# Patient Record
Sex: FEMALE | Race: OTHER | NOT HISPANIC OR LATINO | URBAN - METROPOLITAN AREA
[De-identification: names, ages, dates, MRNs, and addresses within clinical notes are randomized per-mention and may not be internally consistent; named-entity substitution may affect disease eponyms.]

---

## 2020-01-22 ENCOUNTER — OUTPATIENT (OUTPATIENT)
Dept: OUTPATIENT SERVICES | Facility: HOSPITAL | Age: 80
LOS: 1 days | End: 2020-01-22
Payer: MEDICARE

## 2020-01-22 VITALS
RESPIRATION RATE: 20 BRPM | TEMPERATURE: 99 F | OXYGEN SATURATION: 97 % | WEIGHT: 223.11 LBS | HEART RATE: 72 BPM | DIASTOLIC BLOOD PRESSURE: 82 MMHG | SYSTOLIC BLOOD PRESSURE: 138 MMHG | HEIGHT: 60 IN

## 2020-01-22 DIAGNOSIS — Z90.710 ACQUIRED ABSENCE OF BOTH CERVIX AND UTERUS: Chronic | ICD-10-CM

## 2020-01-22 DIAGNOSIS — Z98.890 OTHER SPECIFIED POSTPROCEDURAL STATES: Chronic | ICD-10-CM

## 2020-01-22 DIAGNOSIS — E11.9 TYPE 2 DIABETES MELLITUS WITHOUT COMPLICATIONS: ICD-10-CM

## 2020-01-22 DIAGNOSIS — K43.9 VENTRAL HERNIA WITHOUT OBSTRUCTION OR GANGRENE: ICD-10-CM

## 2020-01-22 DIAGNOSIS — Z90.49 ACQUIRED ABSENCE OF OTHER SPECIFIED PARTS OF DIGESTIVE TRACT: Chronic | ICD-10-CM

## 2020-01-22 DIAGNOSIS — Z01.818 ENCOUNTER FOR OTHER PREPROCEDURAL EXAMINATION: ICD-10-CM

## 2020-01-22 DIAGNOSIS — L98.9 DISORDER OF THE SKIN AND SUBCUTANEOUS TISSUE, UNSPECIFIED: ICD-10-CM

## 2020-01-22 LAB
ANION GAP SERPL CALC-SCNC: 12 MMOL/L — SIGNIFICANT CHANGE UP (ref 5–17)
BLD GP AB SCN SERPL QL: NEGATIVE — SIGNIFICANT CHANGE UP
BUN SERPL-MCNC: 33 MG/DL — HIGH (ref 7–23)
CALCIUM SERPL-MCNC: 9.9 MG/DL — SIGNIFICANT CHANGE UP (ref 8.4–10.5)
CHLORIDE SERPL-SCNC: 103 MMOL/L — SIGNIFICANT CHANGE UP (ref 96–108)
CO2 SERPL-SCNC: 26 MMOL/L — SIGNIFICANT CHANGE UP (ref 22–31)
CREAT SERPL-MCNC: 1.18 MG/DL — SIGNIFICANT CHANGE UP (ref 0.5–1.3)
GLUCOSE SERPL-MCNC: 87 MG/DL — SIGNIFICANT CHANGE UP (ref 70–99)
HBA1C BLD-MCNC: 5.6 % — SIGNIFICANT CHANGE UP (ref 4–5.6)
HCT VFR BLD CALC: 34.6 % — SIGNIFICANT CHANGE UP (ref 34.5–45)
HGB BLD-MCNC: 11.4 G/DL — LOW (ref 11.5–15.5)
MCHC RBC-ENTMCNC: 30.1 PG — SIGNIFICANT CHANGE UP (ref 27–34)
MCHC RBC-ENTMCNC: 32.9 GM/DL — SIGNIFICANT CHANGE UP (ref 32–36)
MCV RBC AUTO: 91.3 FL — SIGNIFICANT CHANGE UP (ref 80–100)
MRSA PCR RESULT.: SIGNIFICANT CHANGE UP
PLATELET # BLD AUTO: 192 K/UL — SIGNIFICANT CHANGE UP (ref 150–400)
POTASSIUM SERPL-MCNC: 4.3 MMOL/L — SIGNIFICANT CHANGE UP (ref 3.5–5.3)
POTASSIUM SERPL-SCNC: 4.3 MMOL/L — SIGNIFICANT CHANGE UP (ref 3.5–5.3)
RBC # BLD: 3.79 M/UL — LOW (ref 3.8–5.2)
RBC # FLD: 14.7 % — HIGH (ref 10.3–14.5)
RH IG SCN BLD-IMP: POSITIVE — SIGNIFICANT CHANGE UP
S AUREUS DNA NOSE QL NAA+PROBE: DETECTED
SODIUM SERPL-SCNC: 141 MMOL/L — SIGNIFICANT CHANGE UP (ref 135–145)
WBC # BLD: 7.05 K/UL — SIGNIFICANT CHANGE UP (ref 3.8–10.5)
WBC # FLD AUTO: 7.05 K/UL — SIGNIFICANT CHANGE UP (ref 3.8–10.5)

## 2020-01-22 PROCEDURE — 87641 MR-STAPH DNA AMP PROBE: CPT

## 2020-01-22 PROCEDURE — 86900 BLOOD TYPING SEROLOGIC ABO: CPT

## 2020-01-22 PROCEDURE — 87640 STAPH A DNA AMP PROBE: CPT

## 2020-01-22 PROCEDURE — 86901 BLOOD TYPING SEROLOGIC RH(D): CPT

## 2020-01-22 PROCEDURE — 85027 COMPLETE CBC AUTOMATED: CPT

## 2020-01-22 PROCEDURE — 83036 HEMOGLOBIN GLYCOSYLATED A1C: CPT

## 2020-01-22 PROCEDURE — 86850 RBC ANTIBODY SCREEN: CPT

## 2020-01-22 PROCEDURE — 80048 BASIC METABOLIC PNL TOTAL CA: CPT

## 2020-01-22 PROCEDURE — G0463: CPT

## 2020-01-22 RX ORDER — CEFAZOLIN SODIUM 1 G
2000 VIAL (EA) INJECTION ONCE
Refills: 0 | Status: COMPLETED | OUTPATIENT
Start: 2020-01-29 | End: 2020-01-29

## 2020-01-22 NOTE — H&P PST ADULT - NSANTHOSAYNRD_GEN_A_CORE
No. RUPINDER screening performed.  STOP BANG Legend: 0-2 = LOW Risk; 3-4 = INTERMEDIATE Risk; 5-8 = HIGH Risk

## 2020-01-22 NOTE — H&P PST ADULT - HISTORY OF PRESENT ILLNESS
78 y/o F PMH morbid obesity, has had a 100lb weight loss over the past year and a half, was admitted to [facility] for 2 weeks with bilateral leg edema, went to rehabilitation for 7 weeks after D/Mohan, is receiving home nursing services to perform dressing changes of ulcers on the groin region.  Presents today for panniculectomy, possible placement of wound vac and ventral hernia repair. 78 y/o F PMH morbid obesity, has had a 100lb weight loss over the past year and a half, was admitted to [facility] for 2 weeks with bilateral leg edema, went to rehabilitation for 7 weeks after D/Mohan in December, is receiving home nursing services to perform dressing changes of ulcers on the groin region.  Presents today for panniculectomy, possible placement of wound vac and ventral hernia repair. 80 y/o F PMH Hepatitis B, morbid obesity, has had a 100lb weight loss over the past year and a half, was admitted to [facility] for 2 weeks with bilateral leg edema, went to rehabilitation for 7 weeks after D/Mhoan in December, has a large abdominal mass that was found to be a ventral hernia on imaging at [facility], has caused ulceration from friction and is receiving home nursing services to perform dressing changes of ulcers on the groin region.  Presents today for panniculectomy, possible placement of wound vac and ventral hernia repair.

## 2020-01-22 NOTE — H&P PST ADULT - NSICDXPASTMEDICALHX_GEN_ALL_CORE_FT
PAST MEDICAL HISTORY:  Obesity, morbid PAST MEDICAL HISTORY:  Bacterial septicemia     Cellulitis     Diabetes     Edema leg     Obesity, morbid

## 2020-01-22 NOTE — H&P PST ADULT - NSICDXPROBLEM_GEN_ALL_CORE_FT
PROBLEM DIAGNOSES  Problem: Ventral hernia  Assessment and Plan: Panniculectomy, possible placement of wound vac, ventral hernia repair, as per Dr. Currie, will hold aspirin starting tomorrow    Problem: Diabetes  Assessment and Plan: Instructed to hold metformin the night before and morning of surgery

## 2020-01-22 NOTE — H&P PST ADULT - SKIN COMMENTS
as per patient, has ulcerations of the groin area that are being dressed by RN at home, unable to assess today due to body habitus

## 2020-01-22 NOTE — H&P PST ADULT - NSICDXPASTSURGICALHX_GEN_ALL_CORE_FT
PAST SURGICAL HISTORY:  S/P appendectomy     S/P breast lumpectomy     S/P exploratory laparotomy due to post-op infection from appendectomy as per patient    S/P hysterectomy PAST SURGICAL HISTORY:  S/P appendectomy     S/P breast lumpectomy benign mass    S/P exploratory laparotomy due to post-op infection from appendectomy as per patient    S/P hysterectomy

## 2020-01-28 ENCOUNTER — TRANSCRIPTION ENCOUNTER (OUTPATIENT)
Age: 80
End: 2020-01-28

## 2020-01-29 ENCOUNTER — RESULT REVIEW (OUTPATIENT)
Age: 80
End: 2020-01-29

## 2020-01-29 ENCOUNTER — INPATIENT (INPATIENT)
Facility: HOSPITAL | Age: 80
LOS: 5 days | Discharge: HOME | DRG: 623 | End: 2020-02-04
Attending: SURGERY | Admitting: SURGERY
Payer: MEDICARE

## 2020-01-29 VITALS
OXYGEN SATURATION: 100 % | TEMPERATURE: 98 F | SYSTOLIC BLOOD PRESSURE: 148 MMHG | WEIGHT: 223.11 LBS | HEIGHT: 60 IN | HEART RATE: 69 BPM | RESPIRATION RATE: 18 BRPM | DIASTOLIC BLOOD PRESSURE: 71 MMHG

## 2020-01-29 DIAGNOSIS — Z90.710 ACQUIRED ABSENCE OF BOTH CERVIX AND UTERUS: Chronic | ICD-10-CM

## 2020-01-29 DIAGNOSIS — Z98.890 OTHER SPECIFIED POSTPROCEDURAL STATES: Chronic | ICD-10-CM

## 2020-01-29 DIAGNOSIS — L98.9 DISORDER OF THE SKIN AND SUBCUTANEOUS TISSUE, UNSPECIFIED: ICD-10-CM

## 2020-01-29 DIAGNOSIS — Z90.49 ACQUIRED ABSENCE OF OTHER SPECIFIED PARTS OF DIGESTIVE TRACT: Chronic | ICD-10-CM

## 2020-01-29 DIAGNOSIS — K43.9 VENTRAL HERNIA WITHOUT OBSTRUCTION OR GANGRENE: ICD-10-CM

## 2020-01-29 LAB — GLUCOSE BLDC GLUCOMTR-MCNC: 94 MG/DL — SIGNIFICANT CHANGE UP (ref 70–99)

## 2020-01-29 PROCEDURE — 88305 TISSUE EXAM BY PATHOLOGIST: CPT | Mod: 26

## 2020-01-29 RX ORDER — SODIUM CHLORIDE 9 MG/ML
1000 INJECTION, SOLUTION INTRAVENOUS
Refills: 0 | Status: DISCONTINUED | OUTPATIENT
Start: 2020-01-29 | End: 2020-02-02

## 2020-01-29 RX ORDER — SODIUM CHLORIDE 9 MG/ML
3 INJECTION INTRAMUSCULAR; INTRAVENOUS; SUBCUTANEOUS EVERY 8 HOURS
Refills: 0 | Status: DISCONTINUED | OUTPATIENT
Start: 2020-01-29 | End: 2020-01-29

## 2020-01-29 RX ORDER — ONDANSETRON 8 MG/1
4 TABLET, FILM COATED ORAL ONCE
Refills: 0 | Status: DISCONTINUED | OUTPATIENT
Start: 2020-01-29 | End: 2020-01-29

## 2020-01-29 RX ORDER — ACETAMINOPHEN 500 MG
975 TABLET ORAL EVERY 6 HOURS
Refills: 0 | Status: DISCONTINUED | OUTPATIENT
Start: 2020-01-29 | End: 2020-02-04

## 2020-01-29 RX ORDER — CEFAZOLIN SODIUM 1 G
2000 VIAL (EA) INJECTION EVERY 8 HOURS
Refills: 0 | Status: COMPLETED | OUTPATIENT
Start: 2020-01-29 | End: 2020-02-03

## 2020-01-29 RX ORDER — HYDROMORPHONE HYDROCHLORIDE 2 MG/ML
0.5 INJECTION INTRAMUSCULAR; INTRAVENOUS; SUBCUTANEOUS EVERY 4 HOURS
Refills: 0 | Status: DISCONTINUED | OUTPATIENT
Start: 2020-01-29 | End: 2020-01-30

## 2020-01-29 RX ORDER — CHLORHEXIDINE GLUCONATE 213 G/1000ML
1 SOLUTION TOPICAL ONCE
Refills: 0 | Status: DISCONTINUED | OUTPATIENT
Start: 2020-01-29 | End: 2020-01-29

## 2020-01-29 RX ORDER — LOSARTAN POTASSIUM 100 MG/1
100 TABLET, FILM COATED ORAL DAILY
Refills: 0 | Status: DISCONTINUED | OUTPATIENT
Start: 2020-01-29 | End: 2020-02-04

## 2020-01-29 RX ORDER — HYDROCHLOROTHIAZIDE 25 MG
25 TABLET ORAL DAILY
Refills: 0 | Status: DISCONTINUED | OUTPATIENT
Start: 2020-01-29 | End: 2020-02-04

## 2020-01-29 RX ORDER — OXYCODONE HYDROCHLORIDE 5 MG/1
10 TABLET ORAL EVERY 6 HOURS
Refills: 0 | Status: DISCONTINUED | OUTPATIENT
Start: 2020-01-29 | End: 2020-01-30

## 2020-01-29 RX ORDER — LIDOCAINE HCL 20 MG/ML
0.2 VIAL (ML) INJECTION ONCE
Refills: 0 | Status: DISCONTINUED | OUTPATIENT
Start: 2020-01-29 | End: 2020-01-29

## 2020-01-29 RX ORDER — CALCIUM CARBONATE 500(1250)
1 TABLET ORAL EVERY 4 HOURS
Refills: 0 | Status: DISCONTINUED | OUTPATIENT
Start: 2020-01-29 | End: 2020-02-04

## 2020-01-29 RX ORDER — KETOROLAC TROMETHAMINE 30 MG/ML
15 SYRINGE (ML) INJECTION EVERY 6 HOURS
Refills: 0 | Status: DISCONTINUED | OUTPATIENT
Start: 2020-01-29 | End: 2020-01-30

## 2020-01-29 RX ORDER — DIPHENHYDRAMINE HCL 50 MG
25 CAPSULE ORAL EVERY 4 HOURS
Refills: 0 | Status: DISCONTINUED | OUTPATIENT
Start: 2020-01-29 | End: 2020-02-04

## 2020-01-29 RX ORDER — ENOXAPARIN SODIUM 100 MG/ML
40 INJECTION SUBCUTANEOUS DAILY
Refills: 0 | Status: DISCONTINUED | OUTPATIENT
Start: 2020-01-29 | End: 2020-02-04

## 2020-01-29 RX ORDER — ONDANSETRON 8 MG/1
4 TABLET, FILM COATED ORAL EVERY 6 HOURS
Refills: 0 | Status: DISCONTINUED | OUTPATIENT
Start: 2020-01-29 | End: 2020-02-04

## 2020-01-29 RX ORDER — SIMVASTATIN 20 MG/1
20 TABLET, FILM COATED ORAL AT BEDTIME
Refills: 0 | Status: DISCONTINUED | OUTPATIENT
Start: 2020-01-29 | End: 2020-02-04

## 2020-01-29 RX ORDER — HYDROMORPHONE HYDROCHLORIDE 2 MG/ML
0.5 INJECTION INTRAMUSCULAR; INTRAVENOUS; SUBCUTANEOUS
Refills: 0 | Status: DISCONTINUED | OUTPATIENT
Start: 2020-01-29 | End: 2020-01-29

## 2020-01-29 RX ORDER — BENZOCAINE AND MENTHOL 5; 1 G/100ML; G/100ML
1 LIQUID ORAL
Refills: 0 | Status: DISCONTINUED | OUTPATIENT
Start: 2020-01-29 | End: 2020-02-04

## 2020-01-29 RX ORDER — FLUTICASONE PROPIONATE 50 MCG
1 SPRAY, SUSPENSION NASAL DAILY
Refills: 0 | Status: DISCONTINUED | OUTPATIENT
Start: 2020-01-29 | End: 2020-02-04

## 2020-01-29 RX ADMIN — Medication 15 MILLIGRAM(S): at 15:00

## 2020-01-29 RX ADMIN — Medication 100 MILLIGRAM(S): at 15:30

## 2020-01-29 RX ADMIN — Medication 15 MILLIGRAM(S): at 15:15

## 2020-01-29 RX ADMIN — SODIUM CHLORIDE 3 MILLILITER(S): 9 INJECTION INTRAMUSCULAR; INTRAVENOUS; SUBCUTANEOUS at 15:22

## 2020-01-29 RX ADMIN — SIMVASTATIN 20 MILLIGRAM(S): 20 TABLET, FILM COATED ORAL at 22:19

## 2020-01-29 RX ADMIN — Medication 975 MILLIGRAM(S): at 17:32

## 2020-01-29 NOTE — PRE-ANESTHESIA EVALUATION ADULT - NSANTHPMHFT_GEN_ALL_CORE
No CP/SOB with exertion, activity limited by painful LE swelling and pannus. Recent hospital admission for LE edema - not 2/2 cardiac disease as per patient report

## 2020-01-29 NOTE — BRIEF OPERATIVE NOTE - OPERATION/FINDINGS
Removal of large, approximately 15kg lymphedematous pannus from the lower abdomen/groin. Closed with wound vac sponge laterally on both sides and incisional vac across the middle.

## 2020-01-29 NOTE — BRIEF OPERATIVE NOTE - NSICDXBRIEFPROCEDURE_GEN_ALL_CORE_FT
PROCEDURES:  Surgical removal of excessive skin and subcutaneous tissue of abdomen with infraumbilical panniculectomy 29-Jan-2020 14:29:00  Saroj Almeida

## 2020-01-30 ENCOUNTER — TRANSCRIPTION ENCOUNTER (OUTPATIENT)
Age: 80
End: 2020-01-30

## 2020-01-30 LAB
ANION GAP SERPL CALC-SCNC: 11 MMOL/L — SIGNIFICANT CHANGE UP (ref 5–17)
BUN SERPL-MCNC: 33 MG/DL — HIGH (ref 7–23)
CALCIUM SERPL-MCNC: 7.8 MG/DL — LOW (ref 8.4–10.5)
CHLORIDE SERPL-SCNC: 106 MMOL/L — SIGNIFICANT CHANGE UP (ref 96–108)
CO2 SERPL-SCNC: 21 MMOL/L — LOW (ref 22–31)
CREAT SERPL-MCNC: 1.1 MG/DL — SIGNIFICANT CHANGE UP (ref 0.5–1.3)
GLUCOSE BLDC GLUCOMTR-MCNC: 104 MG/DL — HIGH (ref 70–99)
GLUCOSE BLDC GLUCOMTR-MCNC: 136 MG/DL — HIGH (ref 70–99)
GLUCOSE SERPL-MCNC: 142 MG/DL — HIGH (ref 70–99)
HCT VFR BLD CALC: 25.5 % — LOW (ref 34.5–45)
HGB BLD-MCNC: 8.1 G/DL — LOW (ref 11.5–15.5)
MCHC RBC-ENTMCNC: 29 PG — SIGNIFICANT CHANGE UP (ref 27–34)
MCHC RBC-ENTMCNC: 31.8 GM/DL — LOW (ref 32–36)
MCV RBC AUTO: 91.4 FL — SIGNIFICANT CHANGE UP (ref 80–100)
NRBC # BLD: 0 /100 WBCS — SIGNIFICANT CHANGE UP (ref 0–0)
PLATELET # BLD AUTO: 211 K/UL — SIGNIFICANT CHANGE UP (ref 150–400)
POTASSIUM SERPL-MCNC: 4.1 MMOL/L — SIGNIFICANT CHANGE UP (ref 3.5–5.3)
POTASSIUM SERPL-SCNC: 4.1 MMOL/L — SIGNIFICANT CHANGE UP (ref 3.5–5.3)
RBC # BLD: 2.79 M/UL — LOW (ref 3.8–5.2)
RBC # FLD: 14.3 % — SIGNIFICANT CHANGE UP (ref 10.3–14.5)
SODIUM SERPL-SCNC: 138 MMOL/L — SIGNIFICANT CHANGE UP (ref 135–145)
WBC # BLD: 10.87 K/UL — HIGH (ref 3.8–10.5)
WBC # FLD AUTO: 10.87 K/UL — HIGH (ref 3.8–10.5)

## 2020-01-30 RX ORDER — OXYCODONE HYDROCHLORIDE 5 MG/1
5 TABLET ORAL EVERY 4 HOURS
Refills: 0 | Status: DISCONTINUED | OUTPATIENT
Start: 2020-01-30 | End: 2020-02-01

## 2020-01-30 RX ORDER — SIMETHICONE 80 MG/1
80 TABLET, CHEWABLE ORAL THREE TIMES A DAY
Refills: 0 | Status: DISCONTINUED | OUTPATIENT
Start: 2020-01-30 | End: 2020-02-04

## 2020-01-30 RX ORDER — OXYCODONE HYDROCHLORIDE 5 MG/1
10 TABLET ORAL EVERY 4 HOURS
Refills: 0 | Status: DISCONTINUED | OUTPATIENT
Start: 2020-01-30 | End: 2020-02-01

## 2020-01-30 RX ORDER — OXYCODONE HYDROCHLORIDE 5 MG/1
1 TABLET ORAL
Qty: 10 | Refills: 0
Start: 2020-01-30

## 2020-01-30 RX ORDER — OXYCODONE HYDROCHLORIDE 5 MG/1
5 TABLET ORAL EVERY 4 HOURS
Refills: 0 | Status: DISCONTINUED | OUTPATIENT
Start: 2020-01-30 | End: 2020-01-30

## 2020-01-30 RX ADMIN — Medication 975 MILLIGRAM(S): at 02:21

## 2020-01-30 RX ADMIN — Medication 975 MILLIGRAM(S): at 16:08

## 2020-01-30 RX ADMIN — SIMVASTATIN 20 MILLIGRAM(S): 20 TABLET, FILM COATED ORAL at 22:27

## 2020-01-30 RX ADMIN — Medication 15 MILLIGRAM(S): at 06:00

## 2020-01-30 RX ADMIN — Medication 1 SPRAY(S): at 16:09

## 2020-01-30 RX ADMIN — SODIUM CHLORIDE 50 MILLILITER(S): 9 INJECTION, SOLUTION INTRAVENOUS at 10:52

## 2020-01-30 RX ADMIN — Medication 100 MILLIGRAM(S): at 17:33

## 2020-01-30 RX ADMIN — Medication 100 MILLIGRAM(S): at 00:48

## 2020-01-30 RX ADMIN — Medication 975 MILLIGRAM(S): at 10:51

## 2020-01-30 RX ADMIN — Medication 975 MILLIGRAM(S): at 22:27

## 2020-01-30 RX ADMIN — SODIUM CHLORIDE 50 MILLILITER(S): 9 INJECTION, SOLUTION INTRAVENOUS at 22:26

## 2020-01-30 RX ADMIN — BENZOCAINE AND MENTHOL 1 LOZENGE: 5; 1 LIQUID ORAL at 10:53

## 2020-01-30 RX ADMIN — ENOXAPARIN SODIUM 40 MILLIGRAM(S): 100 INJECTION SUBCUTANEOUS at 10:52

## 2020-01-30 RX ADMIN — Medication 15 MILLIGRAM(S): at 06:30

## 2020-01-30 RX ADMIN — Medication 100 MILLIGRAM(S): at 10:51

## 2020-01-30 NOTE — PROGRESS NOTE ADULT - SUBJECTIVE AND OBJECTIVE BOX
Surgery Red Team Daily Progress Note   ZACHARY MARIE | MRN-[id number]  --------------------------------------------------------------------------------------------------------------------  SUBJECTIVE / 24H EVENTS  Patient seen and examined on afternoon rounds with attending surgeon. No acute events overnight. Pain well controlled. Patient w/o complaints on rounds. No nausea / vomiting. Ambulating with PT.   --------------------------------------------------------------------------------------------------------------------  OBJECTIVE:    VITAL SIGNS:  T(C): 36.6 (01-30-20 @ 14:10), Max: 36.8 (01-30-20 @ 05:38)  HR: 64 (01-30-20 @ 14:10) (64 - 72)  BP: 93/55 (01-30-20 @ 14:10) (92/50 - 137/63)  RR: 18 (01-30-20 @ 14:10) (14 - 18)  SpO2: 95% (01-30-20 @ 14:10) (95% - 100%)    POCT Blood Glucose.: 136 mg/dL (01-30-20 @ 08:52)      PHYSICAL EXAM:  Gen: NAD  LS: Respirations unlabored.  GI: Soft. Nontender. Surgical incision covered by vac  Ext: Warm, well perfused    01-29-20 @ 07:01  -  01-30-20 @ 07:00  --------------------------------------------------------  IN:    IV PiggyBack: 50 mL    lactated ringers.: 725 mL    Oral Fluid: 500 mL  Total IN: 1275 mL    OUT:    Bulb: 20 mL    Bulb: 90 mL    Indwelling Catheter - Urethral: 1200 mL  Total OUT: 1310 mL    Total NET: -35 mL    01-30-20 @ 07:01  -  01-30-20 @ 15:06  --------------------------------------------------------  IN:    lactated ringers.: 300 mL    Oral Fluid: 480 mL  Total IN: 780 mL    OUT:    Bulb: 10 mL    Bulb: 25 mL    Indwelling Catheter - Urethral: 275 mL  Total OUT: 310 mL    Total NET: 470 mL    LAB VALUES:  01-30    138  |  106  |  33<H>  ----------------------------<  142<H>  4.1   |  21<L>  |  1.10    Ca    7.8<L>      30 Jan 2020 06:35                                 8.1    10.87 )-----------( 211      ( 30 Jan 2020 06:35 )             25.5     MICROBIOLOGY:    No new microbiology data for review.     RADIOLOGY:    No new radiograph imaging for review.     MEDICATIONS  (STANDING):  acetaminophen   Tablet .. 975 milliGRAM(s) Oral every 6 hours  ceFAZolin   IVPB 2000 milliGRAM(s) IV Intermittent every 8 hours  enoxaparin Injectable 40 milliGRAM(s) SubCutaneous daily  fluticasone propionate 50 MICROgram(s)/spray Nasal Spray 1 Spray(s) Both Nostrils daily  hydrochlorothiazide 25 milliGRAM(s) Oral daily  lactated ringers. 1000 milliLiter(s) (50 mL/Hr) IV Continuous <Continuous>  losartan 100 milliGRAM(s) Oral daily  simvastatin 20 milliGRAM(s) Oral at bedtime    MEDICATIONS  (PRN):  aluminum hydroxide/magnesium hydroxide/simethicone Suspension 30 milliLiter(s) Oral every 4 hours PRN Dyspepsia  benzocaine 15 mG/menthol 3.6 mG (Sugar-Free) Lozenge 1 Lozenge Oral every 3 hours PRN Sore Throat  bisacodyl 5 milliGRAM(s) Oral daily PRN Constipation  calcium carbonate    500 mG (Tums) Chewable 1 Tablet(s) Chew every 4 hours PRN Dyspepsia  diphenhydrAMINE 25 milliGRAM(s) Oral every 4 hours PRN Itching  HYDROmorphone  Injectable 0.5 milliGRAM(s) IV Push every 4 hours PRN Severe Pain (7 - 10)  ketorolac   Injectable 15 milliGRAM(s) IV Push every 6 hours PRN Moderate Pain (4 - 6)  ondansetron Injectable 4 milliGRAM(s) IV Push every 6 hours PRN Nausea and/or Vomiting  oxyCODONE    IR 10 milliGRAM(s) Oral every 6 hours PRN Severe Pain (7 - 10)

## 2020-01-30 NOTE — PROGRESS NOTE ADULT - SUBJECTIVE AND OBJECTIVE BOX
Cecily Abreu is now POD#1 s/p excision of large lower abdominal pannus. She did well overnight and throughout the day. Pain controlled with tylenol and toradol. OOB with PT. Harrington removed, awaiting TOV.     AVSS  NAD, resting comfortably in bed  Abdomen is non tender, no palpable collections  Lateral wound VACs and incisional VAC holding suction without leak  Drains are both serosanguinous

## 2020-01-30 NOTE — PROGRESS NOTE ADULT - ASSESSMENT
Now POD#1 s/p panniculectomy, 2 drains, VAC in place. Feeling well overall    -VAC change tomorrow  -continue PT while inpatient, home PT upon discharge  -VNS for VAC changes upon discharge  -DXT ppx   -OOB, to chair, ambulate with assist  -DTV  -tylenol and toradol for pain control  -please call with any additional questions [phone]

## 2020-01-30 NOTE — PHYSICAL THERAPY INITIAL EVALUATION ADULT - DID THE PATIENT HAVE SURGERY?
yes/Removal of large, approximately 15kg lymphedematous pannus from the lower abdomen/groin. Closed with wound vac sponge laterally on both sides and incisional vac across the middle.

## 2020-01-30 NOTE — PHYSICAL THERAPY INITIAL EVALUATION ADULT - PLANNED THERAPY INTERVENTIONS, PT EVAL
strengthening/transfer training/bed mobility training/balance training/gait training strengthening/balance training/bed mobility training/gait training/Negative Pressure Wound Therapy/transfer training

## 2020-01-30 NOTE — DISCHARGE NOTE PROVIDER - NSDCMRMEDTOKEN_GEN_ALL_CORE_FT
aspirin 81 mg oral tablet: 1 tab(s) orally once a day  Cranberry oral capsule: 1 cap(s) orally once a day  fluticasone 50 mcg/inh nasal spray: 1 spray(s) nasal once a day  losartan-hydrochlorothiazide 100 mg-25 mg oral tablet: 1 tab(s) orally once a day  metFORMIN 500 mg oral tablet: 1 tab(s) orally 2 times a day  multivitamin: 1 tab(s) orally once a day  oxyCODONE 5 mg oral tablet: 1 tab(s) orally every 4 hours MDD:6  Oyster Michael 1250 mg (500 mg elemental calcium) oral tablet: 1 tab(s) orally once a day  simvastatin 20 mg oral tablet: 1 tab(s) orally once a day (at bedtime)  Tylenol 325 mg oral tablet: 2 tab(s) orally every 4 hours, As Needed aspirin 81 mg oral tablet: 1 tab(s) orally once a day  bacitracin 500 units/g topical ointment: 1 application topically once a day  Cranberry oral capsule: 1 cap(s) orally once a day  fluticasone 50 mcg/inh nasal spray: 1 spray(s) nasal once a day  losartan-hydrochlorothiazide 100 mg-25 mg oral tablet: 1 tab(s) orally once a day  metFORMIN 500 mg oral tablet: 1 tab(s) orally 2 times a day  multivitamin: 1 tab(s) orally once a day  Oyster Michael 1250 mg (500 mg elemental calcium) oral tablet: 1 tab(s) orally once a day  simvastatin 20 mg oral tablet: 1 tab(s) orally once a day (at bedtime)  traMADol 50 mg oral tablet: 0.5 tab(s) orally every 4 hours, As needed, moderate to severe pain (4 - 10)  Tylenol 325 mg oral tablet: 2 tab(s) orally every 4 hours, As Needed aspirin 81 mg oral tablet: 1 tab(s) orally once a day  bacitracin 500 units/g topical ointment: 1 application topically once a day  Cranberry oral capsule: 1 cap(s) orally once a day  Duricef 500 mg oral capsule: 1 cap(s) orally every 12 hours  fluticasone 50 mcg/inh nasal spray: 1 spray(s) nasal once a day  losartan-hydrochlorothiazide 100 mg-25 mg oral tablet: 1 tab(s) orally once a day  metFORMIN 500 mg oral tablet: 1 tab(s) orally 2 times a day  multivitamin: 1 tab(s) orally once a day  Oyster Michael 1250 mg (500 mg elemental calcium) oral tablet: 1 tab(s) orally once a day  simvastatin 20 mg oral tablet: 1 tab(s) orally once a day (at bedtime)  traMADol 50 mg oral tablet: 0.5 tab(s) orally every 4 hours, As needed, moderate to severe pain (4 - 10)  Tylenol 325 mg oral tablet: 2 tab(s) orally every 4 hours, As Needed aspirin 81 mg oral tablet: 1 tab(s) orally once a day  bacitracin 500 units/g topical ointment: 1 application topically once a day  Cranberry oral capsule: 1 cap(s) orally once a day  Duricef 500 mg oral capsule: 1 cap(s) orally every 12 hours  fluticasone 50 mcg/inh nasal spray: 1 spray(s) nasal once a day  losartan-hydrochlorothiazide 100 mg-25 mg oral tablet: 1 tab(s) orally once a day  multivitamin: 1 tab(s) orally once a day  Oyster Michael 1250 mg (500 mg elemental calcium) oral tablet: 1 tab(s) orally once a day  simvastatin 20 mg oral tablet: 1 tab(s) orally once a day (at bedtime)  traMADol 50 mg oral tablet: 0.5 tab(s) orally every 4 hours, As needed, moderate to severe pain (4 - 10)  Tylenol 325 mg oral tablet: 2 tab(s) orally every 4 hours, As Needed

## 2020-01-30 NOTE — PROGRESS NOTE ADULT - SUBJECTIVE AND OBJECTIVE BOX
Plastic Surgery Progress Note  (pg [facility]: [id number], NS: [phone])    Subjective:  The patient was seen and examined on morning rounds.  denies pain    Objective:    Physical Exam:  Gen: appears well, NAD  Resp: breathing comfortably  Ab: vac in place c/d/i mild TTP    T(C): 36.8 (01-30-20 @ 05:38), Max: 36.8 (01-30-20 @ 05:38)  HR: 66 (01-30-20 @ 05:38) (64 - 72)  BP: 95/56 (01-30-20 @ 05:38) (92/50 - 148/71)  RR: 18 (01-30-20 @ 05:38) (12 - 18)  SpO2: 96% (01-30-20 @ 05:38) (96% - 100%)    01-29-20 @ 07:01  -  01-30-20 @ 06:54  --------------------------------------------------------  IN: 675 mL / OUT: 1160 mL / NET: -485 mL          LABS

## 2020-01-30 NOTE — PHYSICAL THERAPY INITIAL EVALUATION ADULT - CRITERIA FOR SKILLED THERAPEUTIC INTERVENTIONS
rehab potential/predicted duration of therapy intervention/impairments found/functional limitations in following categories/therapy frequency/anticipated equipment needs at discharge/anticipated discharge recommendation/risk reduction/prevention

## 2020-01-30 NOTE — DISCHARGE NOTE PROVIDER - NSDCFUADDINST_GEN_ALL_CORE_FT
Please continue with home care for vac changes three times a week (mondays, wednesdays, fridays).   Avoid heavy lifting while wound is healing with vac.   Please follow up with Dr. Shikowitz-Behr in 1 week. Please continue with home care for vac changes  three times a week (mondays, wednesdays, fridays).  Home PT   Avoid heavy lifting while wound is healing with vac.   Please follow up with Dr. Shikowitz-Behr in 1 week. Please continue with home care for vac changes  three times a week (mondays, wednesdays, fridays) on right and left lateral surgical wounds.  Continue with daily dressing change on lower abdominal incision with bacitracin, telfa and tegaderm.  Avoid heavy lifting while wound is healing with vac.   Please follow up with Dr. Shikowitz-Behr in 1 week. Please continue with vac changes  three times a week (mondays, wednesdays, fridays) on right and left lateral surgical wounds.  Continue with daily dressing change on lower abdominal incision with bacitracin, telfa and tegaderm.  Please follow drain care instructions.  Avoid heavy lifting while wound is healing with vac.   Please follow up with Dr. Shikowitz-Behr in 1 week. Please continue with vac changes  three times a week (mondays, wednesdays, fridays) on right and left lateral surgical wounds.  Continue with daily dressing change on lower abdominal incision with bacitracin, telfa and tegaderm.  Please follow drain care instructions.  Avoid heavy lifting while wound is healing with vac.   You may resume taking aspirin 81mg daily.  Please follow up with Dr. Shikowitz-Behr in 1 week.

## 2020-01-30 NOTE — PHYSICAL THERAPY INITIAL EVALUATION ADULT - IMPAIRMENTS FOUND, PT EVAL
aerobic capacity/endurance/gait, locomotion, and balance/muscle strength aerobic capacity/endurance/integumentary integrity/gait, locomotion, and balance/muscle strength

## 2020-01-30 NOTE — PHYSICAL THERAPY INITIAL EVALUATION ADULT - PERTINENT HX OF CURRENT PROBLEM, REHAB EVAL
78 y/o F PMH Hepatitis B, morbid obesity, has had a 100lb weight loss over the past year and a half, was admitted to [facility] for 2 weeks with bilateral leg edema, went to rehabilitation for 7 weeks after D/Mohan in December, has a large abdominal mass that was found to be a ventral hernia on imaging, has caused ulceration from friction and is receiving home nursing services to perform dressing changes of ulcers on the groin region.  Presents for panniculectomy, mentioned above

## 2020-01-30 NOTE — PROGRESS NOTE ADULT - ASSESSMENT
79F morbid obesity and hep B s/p large panniculectomy 1/29    Plan:  -reg diet  -d/c clark when patient OOB to chair  -appreciate PT recs  -c/w home meds  -PO pain meds  -dvt ppx  -incisional vac, bilateral wound vacs takedown 1/31    PLASTIC SURGERY  (pg [facility]: [id number], NS: [phone])

## 2020-01-30 NOTE — DISCHARGE NOTE PROVIDER - HOSPITAL COURSE
78 y/o F PMH Hepatitis B, morbid obesity, has had a 100lb weight loss over the past year and a half, was admitted to [facility] for 2 weeks with bilateral leg edema, went to rehabilitation for 7 weeks after D/Mohan in December, has a large abdominal mass that was found to be a ventral hernia on imaging at [facility], has caused ulceration from friction and is receiving home nursing services to perform dressing changes of ulcers on the groin region.  Presents 1/29 for panniculectomy, possible placement of wound vac and possible ventral hernia repair.         s/p infraumbilical panniculectomy 1/29    Patient recovered in PACU and transferred to the floor.        clark removed POD1    PT recommended home with home PT, rolling walker (patient has at home).    VNS set up for vac changes.        At time of discharge, pt was tolerating a regular diet, voiding/stooling spontaneously, ambulating, and pain was well-controlled. Patient and family felt ready for discharge. 78 y/o F PMH Hepatitis B, morbid obesity, has had a 100lb weight loss over the past year and a half, was admitted to [facility] for 2 weeks with bilateral leg edema, went to rehabilitation for 7 weeks after D/Mohan in December, has a large abdominal mass that was found to be a ventral hernia on imaging at [facility], has caused ulceration from friction and is receiving home nursing services to perform dressing changes of ulcers on the groin region.  Presents 1/29 for panniculectomy, possible placement of wound vac and possible ventral hernia repair.         s/p infraumbilical panniculectomy 1/29    Patient recovered in PACU and transferred to the floor.        clark removed POD1    PT recommended home with home PT, rolling walker (patient has at home).    On 2/2, patient's family requested patient to return to [facility] that she had come from.    On 2/3, vac was changed and applied to the lateral surgical wounds on each side of the incision, and bacitracin, telfa and tegaderm was placed on incision. Drain teaching was completed.        At time of discharge, pt was tolerating a regular diet, voiding/stooling spontaneously, ambulating, and pain was well-controlled. Patient and family felt ready for discharge. 78 y/o F PMH Hepatitis B, morbid obesity, has had a 100lb weight loss over the past year and a half, was admitted to [facility] for 2 weeks with bilateral leg edema, went to rehabilitation for 7 weeks after D/Mohan in December, has a large abdominal mass that was found to be a ventral hernia on imaging at [facility], has caused ulceration from friction and is receiving home nursing services to perform dressing changes of ulcers on the groin region.  Presents 1/29 for panniculectomy, possible placement of wound vac and possible ventral hernia repair.         s/p infraumbilical panniculectomy 1/29    Patient recovered in PACU and transferred to the floor.        clark removed POD1    PT recommended home with home PT, rolling walker (patient has at home).    On 2/2, patient's family requested patient to return to [facility] that she had come from.    On 2/3, vac was changed and applied to the lateral surgical wounds on each side of the incision, and bacitracin, telfa and tegaderm was placed on incision. Drain teaching was completed.    2/4 discharged to patient's rehab facility to continue three times a week vac changes, daily dressing changes, and drain care.        At time of discharge, pt was tolerating a regular diet, voiding/stooling spontaneously, ambulating, and pain was well-controlled. Patient and family felt ready for discharge.

## 2020-01-30 NOTE — DISCHARGE NOTE PROVIDER - NSDCCPTREATMENT_GEN_ALL_CORE_FT
PRINCIPAL PROCEDURE  Procedure: Surgical removal of excessive skin and subcutaneous tissue of abdomen with infraumbilical panniculectomy  Findings and Treatment:

## 2020-01-30 NOTE — PHYSICAL THERAPY INITIAL EVALUATION ADULT - ACTIVE RANGE OF MOTION EXAMINATION, REHAB EVAL
Per NAHID Carbajal, pt likely ready for discharge tomorrow.  Met with pt and daughter Shira to arrange for equipment delivery this evening. Portable O2 tank for transport home will be delivered to pt room this evening by 8pm from [facility].  The following equipment will be delivered to pt home between 4-8pm today: hospital bed, 1/2 rails, SUSANNE overlay, OBT, commode, oxygen concentrator, highback wheelchair with cushion, and oral suction set up with marion.  Family has a gait belt.    Discussed with Shira and pt that consents for hospice services can be signed tomorrow before discharge so pt is on service when she leaves.   Benito will be at work, but can come at 1200pm to sign consents.  Updated NAHID Raphael, who will update Raven on time.      Would recommend the following comfort meds to be filled here and sent with pt upon discharge:    Atropine 1% drops, give 2-4 drops po/sl every 2 hours PRN secretions  Bisacodyl 10mg supp, give 1 supp daily PRN constipation  Haloperidol 2mg/ml, give 0.5-1mg (0.25-0.5ml) po/sl every 6 hours PRN nausea/agitation  Lorazepam 2mg/ml, give 0.25-0.5 mg (0.125-0.25ml) po/sl every 4 hours PRN anxiety/restlessness  Hydromorphone 10mg/ml, give 4mg (0.4ml) po/sl every 4 hours scheduled, and 2-4mg (0.2-0.4ml) po/sl every 2 hours PRN pain/dyspnea (or dosing at her current orders if this changes)  Acetaminophen 650mg supp, give 1 supp rectally every 4 hours PRN pain/fever  Senna 8.8mg/5ml syrup, give 5-10ml PO BID PRN constipation (would recommended scheduled bowel med with scheduled narcotics if pt not having diarrhea anymore)    Pt would like liquid meds due to difficulty swallowing pills.  May need liquid gabapentin if this was new here at the hospital.  Thank you.      Thank you, please call hospice with any questions    [phone]    Henrique Sullivan RN Referral specialist   bilateral upper extremity Active ROM was WFL (within functional limits)/bilateral  lower extremity Active ROM was WFL (within functional limits)

## 2020-01-30 NOTE — PROGRESS NOTE ADULT - ASSESSMENT
79F hx morbid obesity now s/p large panniculectomy (1/29) by plastic surgery POD1. Patient hemodynamically stable in NAD at present time recovering appropriately.     - patient appears well, recovering appropriately on POD 1, ambulating with PT.   - appreciate continued management by plastic surgery team    Patient seen and examined with attending surgeon ALICE Crain MD.     ALICE Suarez MD, PGY-II  Surgery, Red Team  Pager: [phone]  [facility]

## 2020-01-30 NOTE — PHYSICAL THERAPY INITIAL EVALUATION ADULT - ADDITIONAL COMMENTS
as per pt: PTA pt was living in a  no Stairs lives on first floor and was independent in all functional mobility and ADL's. RW for gait. has a WC, was in rehab for 7 weeks in december, feels that she is moving better than prior to her surgery.

## 2020-01-30 NOTE — PHYSICAL THERAPY INITIAL EVALUATION ADULT - MODALITIES TREATMENT COMMENTS
Pt s/p panniculectomy with primary closure of central 2/3 of incision which is well approximated with scant serosan drainage noted; Pt also with full thickness open surgical wounds laterally which are beefy red and granular.

## 2020-01-30 NOTE — DISCHARGE NOTE PROVIDER - CARE PROVIDER_API CALL
ShikowitzBehr, Lauren B (MD)  Surgery  [address], [address]  [place] [address]  Phone: [phone]  Fax: [phone]  Follow Up Time: 1 week

## 2020-01-31 LAB
HCT VFR BLD CALC: 24 % — LOW (ref 34.5–45)
HGB BLD-MCNC: 7.8 G/DL — LOW (ref 11.5–15.5)
MCHC RBC-ENTMCNC: 29.8 PG — SIGNIFICANT CHANGE UP (ref 27–34)
MCHC RBC-ENTMCNC: 32.5 GM/DL — SIGNIFICANT CHANGE UP (ref 32–36)
MCV RBC AUTO: 91.6 FL — SIGNIFICANT CHANGE UP (ref 80–100)
NRBC # BLD: 0 /100 WBCS — SIGNIFICANT CHANGE UP (ref 0–0)
PLATELET # BLD AUTO: 174 K/UL — SIGNIFICANT CHANGE UP (ref 150–400)
RBC # BLD: 2.62 M/UL — LOW (ref 3.8–5.2)
RBC # FLD: 14.5 % — SIGNIFICANT CHANGE UP (ref 10.3–14.5)
WBC # BLD: 8.3 K/UL — SIGNIFICANT CHANGE UP (ref 3.8–10.5)
WBC # FLD AUTO: 8.3 K/UL — SIGNIFICANT CHANGE UP (ref 3.8–10.5)

## 2020-01-31 RX ORDER — OXYCODONE HYDROCHLORIDE 5 MG/1
5 TABLET ORAL
Refills: 0 | Status: DISCONTINUED | OUTPATIENT
Start: 2020-01-31 | End: 2020-02-01

## 2020-01-31 RX ORDER — OXYCODONE HYDROCHLORIDE 5 MG/1
5 TABLET ORAL DAILY
Refills: 0 | Status: DISCONTINUED | OUTPATIENT
Start: 2020-01-31 | End: 2020-01-31

## 2020-01-31 RX ORDER — KETOROLAC TROMETHAMINE 30 MG/ML
15 SYRINGE (ML) INJECTION EVERY 6 HOURS
Refills: 0 | Status: DISCONTINUED | OUTPATIENT
Start: 2020-01-31 | End: 2020-02-03

## 2020-01-31 RX ADMIN — Medication 975 MILLIGRAM(S): at 06:03

## 2020-01-31 RX ADMIN — OXYCODONE HYDROCHLORIDE 5 MILLIGRAM(S): 5 TABLET ORAL at 09:05

## 2020-01-31 RX ADMIN — Medication 15 MILLIGRAM(S): at 10:23

## 2020-01-31 RX ADMIN — ENOXAPARIN SODIUM 40 MILLIGRAM(S): 100 INJECTION SUBCUTANEOUS at 12:07

## 2020-01-31 RX ADMIN — Medication 100 MILLIGRAM(S): at 01:58

## 2020-01-31 RX ADMIN — Medication 1 SPRAY(S): at 12:06

## 2020-01-31 RX ADMIN — Medication 100 MILLIGRAM(S): at 12:05

## 2020-01-31 RX ADMIN — OXYCODONE HYDROCHLORIDE 5 MILLIGRAM(S): 5 TABLET ORAL at 08:35

## 2020-01-31 RX ADMIN — Medication 15 MILLIGRAM(S): at 10:53

## 2020-01-31 RX ADMIN — Medication 975 MILLIGRAM(S): at 17:13

## 2020-01-31 RX ADMIN — Medication 975 MILLIGRAM(S): at 12:06

## 2020-01-31 RX ADMIN — SIMVASTATIN 20 MILLIGRAM(S): 20 TABLET, FILM COATED ORAL at 21:29

## 2020-01-31 RX ADMIN — Medication 100 MILLIGRAM(S): at 18:13

## 2020-01-31 NOTE — DIETITIAN INITIAL EVALUATION ADULT. - PHYSICAL APPEARANCE
Weight history: Prior to intentional wt loss, pt weighed 315 lbs 2/2 excessive energy intake.  After 1.5 years, pt reports that her UBW is now 224 lbs.  UBW consistent with dosing weight 223.1 lbs.  No new weights to address; continue to monitor given that pt is s/p excision of large abdominal pannus.    Pt agreed to nutrition focused physical exam; exam was performed with RD.  Physical findings showed no signs of fat loss or muscle wasting. obese/well nourished/Height: 5'0", Dosing wt (1/29/20): 223.1 lbs, BMI: 43.6, IBW: 100 lbs +/- 10%, 223%IBW/other (specify)

## 2020-01-31 NOTE — PROGRESS NOTE ADULT - SUBJECTIVE AND OBJECTIVE BOX
Plastic Surgery Progress Note  (pg [facility]: [id number], NS: [phone])    Subjective:  The patient was seen and examined on morning rounds.  notes pain this AM, does not want to take oxy, understands that she should take oxy for pain as needed    Objective:    Physical Exam:  Gen: appears well, NAD  Resp: breathing comfortably  Abdomen is non tender, no palpable collections  Lateral wound VACs and incisional VAC holding suction without leak  Drains are both serosanguinous    T(C): 36.4 (01-31-20 @ 05:21), Max: 36.8 (01-30-20 @ 22:14)  HR: 57 (01-31-20 @ 05:21) (57 - 64)  BP: 103/64 (01-31-20 @ 05:21) (93/55 - 109/76)  RR: 18 (01-31-20 @ 05:21) (18 - 18)  SpO2: 98% (01-31-20 @ 05:21) (95% - 98%)    01-30-20 @ 07:01  -  01-31-20 @ 07:00  --------------------------------------------------------  IN: 2400 mL / OUT: 945 mL / NET: 1455 mL          LABS                        7.8    8.30  )-----------( 174      ( 31 Jan 2020 05:42 )             24.0     01-30    138  |  106  |  33<H>  ----------------------------<  142<H>  4.1   |  21<L>  |  1.10    Ca    7.8<L>      30 Jan 2020 06:35

## 2020-01-31 NOTE — DIETITIAN INITIAL EVALUATION ADULT. - ADD RECOMMEND
1) Continue Consistent Carbohydrate (Evening snack) as tolerated.  2) Continue to monitor nutritional intake, tolerance to diet prescription, BM, weights, labs and skin integrity.  3) Reviewed menu ordering procedure, portion control and healthy food choices with MyPlate.  Pt amenable to handout.  Follow up as appropriate.

## 2020-01-31 NOTE — DIETITIAN INITIAL EVALUATION ADULT. - NS FNS WEIGHT CHANGE REASON
intentional/Pt was trying to lose weight to optimize glycemic control and to achieve healthy body weight

## 2020-01-31 NOTE — PROGRESS NOTE ADULT - ASSESSMENT
79F morbid obesity and hep B s/p large panniculectomy 1/29    Plan:  -reg diet  -appreciate PT recs  -c/w home meds  -pain control  -dvt ppx  -incisional vac, bilateral wound vacs takedown 1/31    PLASTIC SURGERY  (pg [facility]: [id number], NS: [phone])

## 2020-01-31 NOTE — PROGRESS NOTE ADULT - SUBJECTIVE AND OBJECTIVE BOX
POD#2 s/p excision of large lower abdominal pannus. Pt was OOB to the bathroom with assist overnight. C/o some discomfort and pain at the site of right lateral wound VAC. Taking tylenol, hesitant to take oxycodone. Understands that she cannot take toradol long term.      AVSS  NAD, resting comfortably in bed  Abdomen is non tender, no palpable collections  Lateral wound VACs and incisional VAC holding suction without leak  Drains are both serosanguinous, 110 and 60 in last 24hr

## 2020-01-31 NOTE — CHART NOTE - NSCHARTNOTEFT_GEN_A_CORE
Upon Nutritional Assessment by the Registered Dietitian your patient was determined to meet criteria / has evidence of the following diagnosis/diagnoses:          [ ]  Mild Protein Calorie Malnutrition        [ ]  Moderate Protein Calorie Malnutrition        [ ] Severe Protein Calorie Malnutrition        [ ] Unspecified Protein Calorie Malnutrition        [ ] Underweight / BMI <19        [x] Morbid Obesity / BMI > 40      Findings as based on:  [x] Comprehensive nutrition assessment   [x] Nutrition Focused Physical Exam  [x] Other: BMI of 43.6      Nutrition Plan/Recommendations:    1) Continue Consistent Carbohydrate (Evening snack) as tolerated.    2) Continue to monitor nutritional intake, tolerance to diet prescription, BM, weights, labs and skin integrity.    3) Reviewed menu ordering procedure, portion control and healthy food choices with MyPlate.      PROVIDER Section:     By signing this assessment you are acknowledging and agree with the diagnosis/diagnoses assigned by the Registered Dietitian    Comments:

## 2020-01-31 NOTE — PROGRESS NOTE ADULT - ASSESSMENT
Now POD#2 s/p panniculectomy, 2 drains, VAC in place. Feeling well overall    -VAC change today  -continue PT while inpatient, home PT upon discharge  -VNS for VAC changes upon discharge  -DXT ppx with lovenox and ambulation  -OOB, to chair, ambulate with assist  -pain control  -anticipate d/c home on Monday with VNS for VAC changes and home PT  -please call with any additional questions [phone]

## 2020-01-31 NOTE — DIETITIAN INITIAL EVALUATION ADULT. - OTHER INFO
PTA: Pt reports good appetite and PO intakes PTA.  Ate 3 meals a day; followed a consistent carbohydrate diet.  Diet recall indicates adequate energy and protein intake.  Pt follows up with an outpatient primary doctor; previously on DM medications but no longer needed them after weight loss.  Blood glucose now controlled through diet.  A1c 5.6% (1/22/20) indicates well-controlled blood glucose levels PTA.  Pt denies taking any vitamins/supplements at home.  Confirmed no food allergies.     Initial assessment: Pt noted that she had decreased appetite with 50% PO intakes before panniculectomy (1/29) 2/2 pain and discomfort.  However, appetite has improved s/p procedure and is now eating 75% of meals.  Pt reports no known food allergies, no chewing/swallowing difficulty, and no GI distress at this time (Nausea/Vomiting/Diarrhea/Constipation).  States last BM 3 days ago (1/28).  Of note, pt is on bowel regimen PRN.  Per flowsheets, no edema or pressure injuries.    Education: Reviewed menu ordering procedure to optimize PO intakes.  Pt with prior education of carbohydrates and unhealthy food examples.  Reinforced verbal and written education of MyPlate: 1) encouraged healthy food choices for each food group, 2) portion control with each food group, 3) emphasized lean protein intake, 4) limit foods with high sodium, saturated fats, and added sugars.  Pt able to teach back lean protein food examples, portion control, and high sodium foods.  Pt with no additional nutrition-related questions at this time; made aware that RD remains available. PTA: Pt reports good appetite and PO intakes PTA.  Ate 3 meals a day; followed a consistent carbohydrate diet.  Diet recall indicates adequate energy and protein intake.  Pt follows up with an outpatient primary doctor; previously on DM medications but pt states she "no longer needed them after weight loss."  Blood glucose now controlled through diet.  A1c 5.6% (1/22/20) indicates well-controlled blood glucose levels PTA.  Pt denies taking any vitamins/supplements at home.  Confirmed no food allergies.     Initial assessment: Pt noted that she had decreased appetite with 50% PO intakes before panniculectomy (1/29) 2/2 pain and discomfort.  However, appetite has improved s/p procedure and is now eating 75% of meals.  Pt reports no known food allergies, no chewing/swallowing difficulty, and no GI distress at this time (Nausea/Vomiting/Diarrhea/Constipation).  States last BM 3 days ago (1/28).  Of note, pt is on bowel regimen PRN.  Per flowsheets, no edema or pressure injuries.    Education: Reviewed menu ordering procedure to optimize PO intakes.  Pt with prior education of carbohydrates and unhealthy food examples.  Reinforced verbal and written education of MyPlate: 1) encouraged healthy food choices for each food group, 2) portion control with each food group, 3) emphasized lean protein intake, 4) limit foods with high sodium, saturated fats, and added sugars.  Pt able to teach back lean protein food examples, portion control, and high sodium foods.  Pt with no additional nutrition-related questions at this time; made aware that RD remains available.

## 2020-01-31 NOTE — DIETITIAN INITIAL EVALUATION ADULT. - ENERGY NEEDS
Pertinent information: Pt with PMH Hepatitis B, cellulitis, DM, morbid obesity, "100 lb wt loss" x 1.5 years. Found ventral hernia at OSH, caused ulceration from friction and received home nursing services for dressing changes of ulcers on groin. Admitted to [facility] on 1/26 for panniculectomy.  Pt now POD #2 s/p excision of large lower abdominal pannus (1/29), 2 drains, VAC in place.  VAC change today (1/31).

## 2020-02-01 RX ORDER — TRAMADOL HYDROCHLORIDE 50 MG/1
25 TABLET ORAL EVERY 4 HOURS
Refills: 0 | Status: DISCONTINUED | OUTPATIENT
Start: 2020-02-01 | End: 2020-02-04

## 2020-02-01 RX ADMIN — Medication 100 MILLIGRAM(S): at 01:43

## 2020-02-01 RX ADMIN — Medication 975 MILLIGRAM(S): at 16:19

## 2020-02-01 RX ADMIN — Medication 100 MILLIGRAM(S): at 17:37

## 2020-02-01 RX ADMIN — ENOXAPARIN SODIUM 40 MILLIGRAM(S): 100 INJECTION SUBCUTANEOUS at 12:37

## 2020-02-01 RX ADMIN — Medication 100 MILLIGRAM(S): at 09:36

## 2020-02-01 RX ADMIN — TRAMADOL HYDROCHLORIDE 25 MILLIGRAM(S): 50 TABLET ORAL at 10:06

## 2020-02-01 RX ADMIN — Medication 975 MILLIGRAM(S): at 16:50

## 2020-02-01 RX ADMIN — Medication 975 MILLIGRAM(S): at 09:36

## 2020-02-01 RX ADMIN — Medication 975 MILLIGRAM(S): at 02:13

## 2020-02-01 RX ADMIN — Medication 1 SPRAY(S): at 12:37

## 2020-02-01 RX ADMIN — TRAMADOL HYDROCHLORIDE 25 MILLIGRAM(S): 50 TABLET ORAL at 09:34

## 2020-02-01 RX ADMIN — SIMVASTATIN 20 MILLIGRAM(S): 20 TABLET, FILM COATED ORAL at 22:11

## 2020-02-01 RX ADMIN — Medication 975 MILLIGRAM(S): at 01:43

## 2020-02-01 RX ADMIN — Medication 975 MILLIGRAM(S): at 10:06

## 2020-02-01 NOTE — PROGRESS NOTE ADULT - ASSESSMENT
Now POD#3 s/p panniculectomy, 2 drains, VAC in place. S/p VAC change yesterday, now c/o mild discomfort at site of lateral wound VACs.    _encourage OOB to chair and ambulate with assist  -continue PT while inpatient, home PT upon discharge  -VNS for VAC changes upon discharge  -DXT ppx with lovenox and ambulation  -pain control, adding tramadol  -anticipate d/c home on Monday with VNS for VAC changes and home PT  -please call with any additional questions [phone]

## 2020-02-01 NOTE — PROGRESS NOTE ADULT - ASSESSMENT
79F morbid obesity and hep B s/p large panniculectomy 1/29    Plan:  -reg diet  -appreciate PT recs- home with home PT  -c/w home meds  -pain control, adding tramadol  -dvt ppx  -incisional vac, bilateral wound vacs takedown 1/31, next vac change 2/3    PLASTIC SURGERY  (pg [facility]: [id number], NS: [phone])

## 2020-02-01 NOTE — PROGRESS NOTE ADULT - SUBJECTIVE AND OBJECTIVE BOX
POD#3 s/p excision of large lower abdominal pannus.  C/o some discomfort and pain at the site of lateral wound VACs s/p VAC change yesterday. She took percocet but did not like the way she felt and would prefer not to take narcotics from now on. Will try Tramadol.     AVSS  NAD, resting comfortably in bed  Abdomen is non tender, no palpable collections  Lateral wound VACs and incisional VAC holding suction without leak  Drains are both serosanguinous

## 2020-02-01 NOTE — PROGRESS NOTE ADULT - SUBJECTIVE AND OBJECTIVE BOX
Plastic Surgery Progress Note  (pg [facility]: [id number], NS: [phone])    Subjective:  The patient was seen and examined on morning rounds.  does not want oxy, willing to try tramadol    Objective:    Physical Exam:  Gen: appears well, NAD  Resp: breathing comfortably  Abdomen is non tender, no palpable collections  Lateral wound VACs and incisional VAC holding suction without leak  Drains are both serosanguinous    T(C): 37.6 (02-01-20 @ 05:07), Max: 37.6 (02-01-20 @ 05:07)  HR: 62 (02-01-20 @ 05:07) (56 - 80)  BP: 99/54 (02-01-20 @ 05:07) (93/55 - 106/64)  RR: 17 (02-01-20 @ 05:07) (17 - 18)  SpO2: 96% (02-01-20 @ 05:07) (96% - 100%)    01-31-20 @ 07:01  -  02-01-20 @ 07:00  --------------------------------------------------------  IN: 2700 mL / OUT: 1150 mL / NET: 1550 mL          LABS                        7.8    8.30  )-----------( 174      ( 31 Jan 2020 05:42 )             24.0

## 2020-02-02 RX ADMIN — TRAMADOL HYDROCHLORIDE 25 MILLIGRAM(S): 50 TABLET ORAL at 01:56

## 2020-02-02 RX ADMIN — TRAMADOL HYDROCHLORIDE 25 MILLIGRAM(S): 50 TABLET ORAL at 19:00

## 2020-02-02 RX ADMIN — LOSARTAN POTASSIUM 100 MILLIGRAM(S): 100 TABLET, FILM COATED ORAL at 06:13

## 2020-02-02 RX ADMIN — Medication 975 MILLIGRAM(S): at 10:12

## 2020-02-02 RX ADMIN — TRAMADOL HYDROCHLORIDE 25 MILLIGRAM(S): 50 TABLET ORAL at 22:58

## 2020-02-02 RX ADMIN — Medication 25 MILLIGRAM(S): at 06:13

## 2020-02-02 RX ADMIN — Medication 100 MILLIGRAM(S): at 10:12

## 2020-02-02 RX ADMIN — TRAMADOL HYDROCHLORIDE 25 MILLIGRAM(S): 50 TABLET ORAL at 22:28

## 2020-02-02 RX ADMIN — Medication 975 MILLIGRAM(S): at 01:26

## 2020-02-02 RX ADMIN — SIMVASTATIN 20 MILLIGRAM(S): 20 TABLET, FILM COATED ORAL at 21:30

## 2020-02-02 RX ADMIN — TRAMADOL HYDROCHLORIDE 25 MILLIGRAM(S): 50 TABLET ORAL at 18:26

## 2020-02-02 RX ADMIN — Medication 975 MILLIGRAM(S): at 10:32

## 2020-02-02 RX ADMIN — Medication 100 MILLIGRAM(S): at 18:29

## 2020-02-02 RX ADMIN — TRAMADOL HYDROCHLORIDE 25 MILLIGRAM(S): 50 TABLET ORAL at 01:26

## 2020-02-02 RX ADMIN — Medication 100 MILLIGRAM(S): at 01:59

## 2020-02-02 RX ADMIN — Medication 1 SPRAY(S): at 13:30

## 2020-02-02 RX ADMIN — Medication 975 MILLIGRAM(S): at 18:28

## 2020-02-02 RX ADMIN — Medication 975 MILLIGRAM(S): at 19:00

## 2020-02-02 RX ADMIN — Medication 975 MILLIGRAM(S): at 01:56

## 2020-02-02 RX ADMIN — Medication 975 MILLIGRAM(S): at 21:30

## 2020-02-02 RX ADMIN — ENOXAPARIN SODIUM 40 MILLIGRAM(S): 100 INJECTION SUBCUTANEOUS at 13:30

## 2020-02-02 NOTE — PROGRESS NOTE ADULT - SUBJECTIVE AND OBJECTIVE BOX
POD#4 s/p excision of large lower abdominal pannus.  She has been OOB to the bathroom today. Feeling well. State that she took Tramadol last night and slept very well. Today she has done her hair and makeup, and is sitting in the chair.     AVSS  NAD, sitting comfortably in the chair  Lateral wound VACs and incisional VAC holding suction without leak  Drains are both serosanguinous

## 2020-02-02 NOTE — PROGRESS NOTE ADULT - ASSESSMENT
79F morbid obesity and hep B s/p large panniculectomy 1/29    Plan:  -reg diet  -appreciate PT recs- home with home PT  -c/w home meds  -pain control, continue tramadol  -dvt ppx  -incisional vac, bilateral wound vacs takedown 1/31, next vac change 2/3    PLASTIC SURGERY  (pg [facility]: [id number], NS: [phone])

## 2020-02-02 NOTE — PROGRESS NOTE ADULT - SUBJECTIVE AND OBJECTIVE BOX
ZACHARY MARIE  [id number]    Subjective:    Patient seen and examined, doing well.   Pain controlled with Tramadol, states felling better.        Objective:  T(C): 36.8 (02-02-20 @ 09:14), Max: 37.1 (02-01-20 @ 13:47)  HR: 67 (02-02-20 @ 09:14) (56 - 67)  BP: 94/54 (02-02-20 @ 09:14) (93/54 - 133/73)  RR: 18 (02-02-20 @ 09:14) (17 - 20)  SpO2: 96% (02-02-20 @ 09:14) (96% - 99%)  Wt(kg): --           02-01 @ 07:01  -  02-02 @ 07:00  --------------------------------------------------------  IN: 2440 mL / OUT: 1725 mL / NET: 715 mL      PHYSICAL EXAM:    General: NAD   Abdomen: Wound vac holding, no leaks. No surrounding cellulitis.             MEDICATIONS  (STANDING):  acetaminophen   Tablet .. 975 milliGRAM(s) Oral every 6 hours  ceFAZolin   IVPB 2000 milliGRAM(s) IV Intermittent every 8 hours  enoxaparin Injectable 40 milliGRAM(s) SubCutaneous daily  fluticasone propionate 50 MICROgram(s)/spray Nasal Spray 1 Spray(s) Both Nostrils daily  hydrochlorothiazide 25 milliGRAM(s) Oral daily  lactated ringers. 1000 milliLiter(s) (50 mL/Hr) IV Continuous <Continuous>  losartan 100 milliGRAM(s) Oral daily  simvastatin 20 milliGRAM(s) Oral at bedtime    MEDICATIONS  (PRN):  aluminum hydroxide/magnesium hydroxide/simethicone Suspension 30 milliLiter(s) Oral every 4 hours PRN Dyspepsia  benzocaine 15 mG/menthol 3.6 mG (Sugar-Free) Lozenge 1 Lozenge Oral every 3 hours PRN Sore Throat  bisacodyl 5 milliGRAM(s) Oral daily PRN Constipation  calcium carbonate    500 mG (Tums) Chewable 1 Tablet(s) Chew every 4 hours PRN Dyspepsia  diphenhydrAMINE 25 milliGRAM(s) Oral every 4 hours PRN Itching  ketorolac   Injectable 15 milliGRAM(s) IV Push every 6 hours PRN Moderate Pain (4 - 6)  ondansetron Injectable 4 milliGRAM(s) IV Push every 6 hours PRN Nausea and/or Vomiting  simethicone 80 milliGRAM(s) Chew three times a day PRN Gas  traMADol 25 milliGRAM(s) Oral every 4 hours PRN moderate to severe pain (4 - 10)

## 2020-02-02 NOTE — PROGRESS NOTE ADULT - ASSESSMENT
Now POD#4 s/p panniculectomy, 2 drains, VAC in place. Next VAC change tomorrow. Anticipate d/c home tomorrow with wound VAC, VNS, and home PT.     -ecourage OOB to chair and ambulate with assist  -continue PT while inpatient, home PT upon discharge  -VNS for VAC changes upon discharge, continue bilateral lateral wound VACs, central incision may be dressed with bacitracin, telfa, and tegederm  -DXT ppx with lovenox and ambulation  -pain control  -anticipate d/c home on Monday after VAC change  -home with VNS for VAC changes and home PT  -please call with any additional questions [phone] Now POD#4 s/p panniculectomy, 2 drains, VAC in place. Next VAC change tomorrow. I spoke with the patient's daughter who said that insurance has only approved 3-4 hours of home nursing daily.  family is concerned with d/c to home given that patient cannot ambulate or use the bathroom without assistance.    -PT re-eval for subacute rehab  -ecourage OOB to chair and ambulate with assist  -VNS for VAC changes upon discharge, continue bilateral lateral wound VACs, central incision may be dressed with bacitracin, telfa, and tegederm  -DXT ppx with lovenox and ambulation  -pain control  -dispo planning for early this week   -please call with any additional questions [phone]

## 2020-02-03 LAB — SURGICAL PATHOLOGY STUDY: SIGNIFICANT CHANGE UP

## 2020-02-03 RX ORDER — BACITRACIN ZINC 500 UNIT/G
1 OINTMENT IN PACKET (EA) TOPICAL
Qty: 0 | Refills: 0 | DISCHARGE
Start: 2020-02-03

## 2020-02-03 RX ORDER — TRAMADOL HYDROCHLORIDE 50 MG/1
0.5 TABLET ORAL
Qty: 0 | Refills: 0 | DISCHARGE
Start: 2020-02-03

## 2020-02-03 RX ORDER — BACITRACIN ZINC 500 UNIT/G
1 OINTMENT IN PACKET (EA) TOPICAL DAILY
Refills: 0 | Status: DISCONTINUED | OUTPATIENT
Start: 2020-02-03 | End: 2020-02-04

## 2020-02-03 RX ADMIN — Medication 975 MILLIGRAM(S): at 18:36

## 2020-02-03 RX ADMIN — Medication 975 MILLIGRAM(S): at 21:44

## 2020-02-03 RX ADMIN — TRAMADOL HYDROCHLORIDE 25 MILLIGRAM(S): 50 TABLET ORAL at 21:43

## 2020-02-03 RX ADMIN — Medication 975 MILLIGRAM(S): at 22:25

## 2020-02-03 RX ADMIN — Medication 1 SPRAY(S): at 12:26

## 2020-02-03 RX ADMIN — Medication 100 MILLIGRAM(S): at 01:09

## 2020-02-03 RX ADMIN — Medication 975 MILLIGRAM(S): at 15:42

## 2020-02-03 RX ADMIN — Medication 15 MILLIGRAM(S): at 10:02

## 2020-02-03 RX ADMIN — ENOXAPARIN SODIUM 40 MILLIGRAM(S): 100 INJECTION SUBCUTANEOUS at 12:26

## 2020-02-03 RX ADMIN — Medication 975 MILLIGRAM(S): at 04:26

## 2020-02-03 RX ADMIN — Medication 100 MILLIGRAM(S): at 09:35

## 2020-02-03 RX ADMIN — Medication 15 MILLIGRAM(S): at 09:32

## 2020-02-03 RX ADMIN — Medication 975 MILLIGRAM(S): at 09:32

## 2020-02-03 RX ADMIN — Medication 1 APPLICATION(S): at 09:53

## 2020-02-03 RX ADMIN — SIMVASTATIN 20 MILLIGRAM(S): 20 TABLET, FILM COATED ORAL at 21:44

## 2020-02-03 RX ADMIN — TRAMADOL HYDROCHLORIDE 25 MILLIGRAM(S): 50 TABLET ORAL at 22:25

## 2020-02-03 NOTE — PROGRESS NOTE ADULT - ASSESSMENT
Now POD#5 s/p panniculectomy, 2 drains, VAC in place. Patient accepted to rehab, awaiting a bed.    -ecourage OOB to chair and ambulate with assist  -continue lateral wound VACs  -DXT ppx with lovenox and ambulation  -pain control  -dispo planning for rehab, awaiting bed placement  -follow up within one week from discharge  -please call with any additional questions [phone]

## 2020-02-03 NOTE — PROGRESS NOTE ADULT - ASSESSMENT
79F morbid obesity and hep B s/p large panniculectomy 1/29    Plan:  -reg diet  -appreciate PT recs- home with home PT, family requesting [facility]  -c/w home meds  -pain control, continue tramadol  -dvt ppx  -next vac change 2/3 today: continue bilateral lateral wound VACs, central incision may be dressed with bacitracin, telfa, and tegederm    PLASTIC SURGERY  (pg [facility]: [id number], NS: [phone])

## 2020-02-03 NOTE — PROGRESS NOTE ADULT - SUBJECTIVE AND OBJECTIVE BOX
POD#5 s/p excision of large lower abdominal pannus.  She is OOB to chair. Used the bed pan today. Says she is feeling very well overall, sore at the sites of the lateral wound VACs and some soreness and swelling of the labia. She says she had very little pain with the VAC change today. She Reports that the nurse and staff are fabulous. She was seen by PT today and set up for rehab at [facility], awaiting bed placement.     NAD, sitting comfortably in the chair  Lateral wound VACs holding suction without leak  Central incision c/d/i  Drains are both serosanguinous

## 2020-02-03 NOTE — PROGRESS NOTE ADULT - SUBJECTIVE AND OBJECTIVE BOX
Plastic Surgery Progress Note  (pg [facility]: [id number], NS: [phone])    Subjective:  The patient was seen and examined on morning rounds.  ELIZA overnight  family requesting patient to return to [facility]    Objective:    Physical Exam:  Gen: appears well, NAD  Resp: breathing comfortably  Ab: soft, nontender, drains serous>sanguinous, vac in place c/d/i    T(C): 36.9 (02-03-20 @ 05:02), Max: 37.1 (02-02-20 @ 21:22)  HR: 64 (02-03-20 @ 05:02) (60 - 73)  BP: 106/64 (02-03-20 @ 05:02) (94/54 - 114/57)  RR: 17 (02-03-20 @ 05:02) (17 - 18)  SpO2: 97% (02-03-20 @ 05:02) (96% - 98%)    02-02-20 @ 07:01  -  02-03-20 @ 07:00  --------------------------------------------------------  IN: 1240 mL / OUT: 1170 mL / NET: 70 mL          LABS

## 2020-02-04 ENCOUNTER — TRANSCRIPTION ENCOUNTER (OUTPATIENT)
Age: 80
End: 2020-02-04

## 2020-02-04 VITALS
TEMPERATURE: 99 F | OXYGEN SATURATION: 98 % | DIASTOLIC BLOOD PRESSURE: 50 MMHG | RESPIRATION RATE: 18 BRPM | HEART RATE: 64 BPM | SYSTOLIC BLOOD PRESSURE: 104 MMHG

## 2020-02-04 PROCEDURE — 82962 GLUCOSE BLOOD TEST: CPT

## 2020-02-04 PROCEDURE — 80048 BASIC METABOLIC PNL TOTAL CA: CPT

## 2020-02-04 PROCEDURE — 85027 COMPLETE CBC AUTOMATED: CPT

## 2020-02-04 PROCEDURE — 97164 PT RE-EVAL EST PLAN CARE: CPT

## 2020-02-04 PROCEDURE — 88305 TISSUE EXAM BY PATHOLOGIST: CPT

## 2020-02-04 PROCEDURE — C1889: CPT

## 2020-02-04 PROCEDURE — 97605 NEG PRS WND THER DME<=50SQCM: CPT

## 2020-02-04 PROCEDURE — 97162 PT EVAL MOD COMPLEX 30 MIN: CPT

## 2020-02-04 PROCEDURE — 94640 AIRWAY INHALATION TREATMENT: CPT

## 2020-02-04 RX ORDER — METFORMIN HYDROCHLORIDE 850 MG/1
1 TABLET ORAL
Qty: 0 | Refills: 0 | DISCHARGE

## 2020-02-04 RX ADMIN — TRAMADOL HYDROCHLORIDE 25 MILLIGRAM(S): 50 TABLET ORAL at 12:14

## 2020-02-04 RX ADMIN — TRAMADOL HYDROCHLORIDE 25 MILLIGRAM(S): 50 TABLET ORAL at 13:00

## 2020-02-04 RX ADMIN — LOSARTAN POTASSIUM 100 MILLIGRAM(S): 100 TABLET, FILM COATED ORAL at 04:57

## 2020-02-04 RX ADMIN — Medication 975 MILLIGRAM(S): at 04:58

## 2020-02-04 RX ADMIN — Medication 25 MILLIGRAM(S): at 04:58

## 2020-02-04 RX ADMIN — Medication 975 MILLIGRAM(S): at 05:50

## 2020-02-04 RX ADMIN — TRAMADOL HYDROCHLORIDE 25 MILLIGRAM(S): 50 TABLET ORAL at 16:10

## 2020-02-04 RX ADMIN — Medication 1 SPRAY(S): at 12:11

## 2020-02-04 RX ADMIN — TRAMADOL HYDROCHLORIDE 25 MILLIGRAM(S): 50 TABLET ORAL at 15:32

## 2020-02-04 RX ADMIN — Medication 975 MILLIGRAM(S): at 18:50

## 2020-02-04 RX ADMIN — Medication 1 APPLICATION(S): at 12:15

## 2020-02-04 RX ADMIN — Medication 975 MILLIGRAM(S): at 12:12

## 2020-02-04 RX ADMIN — Medication 975 MILLIGRAM(S): at 13:00

## 2020-02-04 RX ADMIN — ENOXAPARIN SODIUM 40 MILLIGRAM(S): 100 INJECTION SUBCUTANEOUS at 12:15

## 2020-02-04 RX ADMIN — TRAMADOL HYDROCHLORIDE 25 MILLIGRAM(S): 50 TABLET ORAL at 05:50

## 2020-02-04 RX ADMIN — TRAMADOL HYDROCHLORIDE 25 MILLIGRAM(S): 50 TABLET ORAL at 04:57

## 2020-02-04 NOTE — PROGRESS NOTE ADULT - SUBJECTIVE AND OBJECTIVE BOX
POD#6 s/p excision of large lower abdominal pannus.  She has been OOB to chair, used the bed pan this AM.  She is c/p soreness and swelling of the labia, making it difficult to sit on the toilet. Otherwise doing well, pain controlled. Awaiting dispo to rehab.     AVSS  NAD, sitting comfortably in bed eating breakfast  Lateral wound VACs holding suction without leak  Central incision c/d/i  Drains are both serosanguinous  bilateral labia swelling with pitting edema, R>L, tender

## 2020-02-04 NOTE — DISCHARGE NOTE NURSING/CASE MANAGEMENT/SOCIAL WORK - PATIENT PORTAL LINK FT
You can access the FollowMyHealth Patient Portal offered by [facility] by registering at the following website: [email or web address]. By joining [employer]’s FollowMyHealth portal, you will also be able to view your health information using other applications (apps) compatible with our system.

## 2020-02-04 NOTE — PROGRESS NOTE ADULT - ASSESSMENT
Now POD#6 s/p panniculectomy, 2 drains, VAC in place. Patient accepted to rehab, awaiting a bed.    -encourage OOB to chair and ambulate with assist  -continue lateral wound VACs  -PO abx, dicharge with Duricef 500mg BID  -DXT ppx with lovenox and ambulation  -pain control  -dispo planning for rehab, awaiting bed placement  -follow up within one week from discharge  -please call with any additional questions [phone]

## 2020-10-23 PROBLEM — E11.9 TYPE 2 DIABETES MELLITUS WITHOUT COMPLICATIONS: Chronic | Status: ACTIVE | Noted: 2020-01-22

## 2020-10-23 PROBLEM — E66.01 MORBID (SEVERE) OBESITY DUE TO EXCESS CALORIES: Chronic | Status: ACTIVE | Noted: 2020-01-22

## 2020-10-23 PROBLEM — R60.0 LOCALIZED EDEMA: Chronic | Status: ACTIVE | Noted: 2020-01-22

## 2020-10-23 PROBLEM — L03.90 CELLULITIS, UNSPECIFIED: Chronic | Status: ACTIVE | Noted: 2020-01-22

## 2020-10-23 PROBLEM — A41.9 SEPSIS, UNSPECIFIED ORGANISM: Chronic | Status: ACTIVE | Noted: 2020-01-22

## 2020-11-09 ENCOUNTER — RESULT REVIEW (OUTPATIENT)
Age: 80
End: 2020-11-09

## 2020-11-09 ENCOUNTER — OUTPATIENT (OUTPATIENT)
Dept: OUTPATIENT SERVICES | Facility: HOSPITAL | Age: 80
LOS: 1 days | End: 2020-11-09
Payer: MEDICARE

## 2020-11-09 DIAGNOSIS — Z90.49 ACQUIRED ABSENCE OF OTHER SPECIFIED PARTS OF DIGESTIVE TRACT: Chronic | ICD-10-CM

## 2020-11-09 DIAGNOSIS — Z90.710 ACQUIRED ABSENCE OF BOTH CERVIX AND UTERUS: Chronic | ICD-10-CM

## 2020-11-09 DIAGNOSIS — Z98.890 OTHER SPECIFIED POSTPROCEDURAL STATES: Chronic | ICD-10-CM

## 2020-11-09 DIAGNOSIS — I82.401 ACUTE EMBOLISM AND THROMBOSIS OF UNSPECIFIED DEEP VEINS OF RIGHT LOWER EXTREMITY: ICD-10-CM

## 2020-11-09 PROCEDURE — 93971 EXTREMITY STUDY: CPT

## 2020-11-09 PROCEDURE — 93971 EXTREMITY STUDY: CPT | Mod: 26

## 2020-12-22 ENCOUNTER — APPOINTMENT (OUTPATIENT)
Dept: VASCULAR SURGERY | Facility: CLINIC | Age: 80
End: 2020-12-22
Payer: MEDICARE

## 2020-12-22 ENCOUNTER — NON-APPOINTMENT (OUTPATIENT)
Age: 80
End: 2020-12-22

## 2020-12-22 VITALS
HEIGHT: 59 IN | BODY MASS INDEX: 44.15 KG/M2 | SYSTOLIC BLOOD PRESSURE: 130 MMHG | HEART RATE: 57 BPM | DIASTOLIC BLOOD PRESSURE: 74 MMHG | WEIGHT: 219 LBS

## 2020-12-22 VITALS — TEMPERATURE: 97.7 F

## 2020-12-22 PROCEDURE — 93970 EXTREMITY STUDY: CPT

## 2020-12-22 PROCEDURE — 99203 OFFICE O/P NEW LOW 30 MIN: CPT

## 2020-12-24 NOTE — HISTORY OF PRESENT ILLNESS
[FreeTextEntry1] : 79 yo female status post fall with right hip and pelvic floor fracture s/p reconstruction and rehab presents for evaluation of lower extremity wounds that have since healed and states that since the fall has had numbness of the left lower extremity.  \par pt states that she has a pain in the foot in the evening and when walking.  pt denies any history of dvt of the lower extremities \par pt states that the ulcers were blisters initially that then opened into wounds.

## 2020-12-24 NOTE — ASSESSMENT
[FreeTextEntry1] : 81 yo female status post fall with right hip and pelvic floor fracture s/p reconstruction and rehab presents for evaluation of lower extremity wounds that have since healed and states that since the fall has had numbness of the left lower extremity.  \par \par venous duplex shows no evidence of dvt/svt or insufficency \par \par pt advised to follow up with neurology to evaluate for other causes of peripheral neuropathy \par pt to follow up as needed

## 2020-12-24 NOTE — PHYSICAL EXAM
[2+] : left 2+ [No Rash or Lesion] : No rash or lesion [Alert] : alert [Calm] : calm [JVD] : no jugular venous distention  [Ankle Swelling (On Exam)] : not present [Varicose Veins Of Lower Extremities] : not present [] : not present [Skin Ulcer] : no ulcer [de-identified] : appears well  [de-identified] : no calf tenderness

## 2022-07-21 ENCOUNTER — INPATIENT (INPATIENT)
Facility: HOSPITAL | Age: 82
LOS: 7 days | Discharge: NURSING, REHAB OR LONG-TERM CARE | End: 2022-07-29
Attending: HOSPITALIST | Admitting: HOSPITALIST

## 2022-07-21 VITALS
OXYGEN SATURATION: 98 % | RESPIRATION RATE: 18 BRPM | TEMPERATURE: 99 F | SYSTOLIC BLOOD PRESSURE: 139 MMHG | DIASTOLIC BLOOD PRESSURE: 74 MMHG | HEART RATE: 78 BPM

## 2022-07-21 DIAGNOSIS — Z98.890 OTHER SPECIFIED POSTPROCEDURAL STATES: Chronic | ICD-10-CM

## 2022-07-21 DIAGNOSIS — Z90.49 ACQUIRED ABSENCE OF OTHER SPECIFIED PARTS OF DIGESTIVE TRACT: Chronic | ICD-10-CM

## 2022-07-21 DIAGNOSIS — Z90.710 ACQUIRED ABSENCE OF BOTH CERVIX AND UTERUS: Chronic | ICD-10-CM

## 2022-07-21 DIAGNOSIS — I51.9 HEART DISEASE, UNSPECIFIED: ICD-10-CM

## 2022-07-22 DIAGNOSIS — M48.00 SPINAL STENOSIS, SITE UNSPECIFIED: ICD-10-CM

## 2022-07-22 DIAGNOSIS — R55 SYNCOPE AND COLLAPSE: ICD-10-CM

## 2022-07-22 DIAGNOSIS — Z29.9 ENCOUNTER FOR PROPHYLACTIC MEASURES, UNSPECIFIED: ICD-10-CM

## 2022-07-22 DIAGNOSIS — I10 ESSENTIAL (PRIMARY) HYPERTENSION: ICD-10-CM

## 2022-07-22 DIAGNOSIS — I48.91 UNSPECIFIED ATRIAL FIBRILLATION: ICD-10-CM

## 2022-07-22 LAB
A1C WITH ESTIMATED AVERAGE GLUCOSE RESULT: 6.4 % — HIGH (ref 4–5.6)
ALBUMIN SERPL ELPH-MCNC: 3 G/DL — LOW (ref 3.3–5)
ALP SERPL-CCNC: 95 U/L — SIGNIFICANT CHANGE UP (ref 40–120)
ALT FLD-CCNC: 15 U/L — SIGNIFICANT CHANGE UP (ref 4–33)
ANION GAP SERPL CALC-SCNC: 12 MMOL/L — SIGNIFICANT CHANGE UP (ref 7–14)
APTT BLD: 42.9 SEC — HIGH (ref 27–36.3)
AST SERPL-CCNC: 32 U/L — SIGNIFICANT CHANGE UP (ref 4–32)
BASOPHILS # BLD AUTO: 0.06 K/UL — SIGNIFICANT CHANGE UP (ref 0–0.2)
BASOPHILS NFR BLD AUTO: 1.4 % — SIGNIFICANT CHANGE UP (ref 0–2)
BILIRUB SERPL-MCNC: 0.4 MG/DL — SIGNIFICANT CHANGE UP (ref 0.2–1.2)
BLD GP AB SCN SERPL QL: NEGATIVE — SIGNIFICANT CHANGE UP
BUN SERPL-MCNC: 19 MG/DL — SIGNIFICANT CHANGE UP (ref 7–23)
CALCIUM SERPL-MCNC: 9.1 MG/DL — SIGNIFICANT CHANGE UP (ref 8.4–10.5)
CHLORIDE SERPL-SCNC: 107 MMOL/L — SIGNIFICANT CHANGE UP (ref 98–107)
CHOLEST SERPL-MCNC: 97 MG/DL — SIGNIFICANT CHANGE UP
CO2 SERPL-SCNC: 21 MMOL/L — LOW (ref 22–31)
CREAT SERPL-MCNC: 0.78 MG/DL — SIGNIFICANT CHANGE UP (ref 0.5–1.3)
EGFR: 77 ML/MIN/1.73M2 — SIGNIFICANT CHANGE UP
EOSINOPHIL # BLD AUTO: 0.4 K/UL — SIGNIFICANT CHANGE UP (ref 0–0.5)
EOSINOPHIL NFR BLD AUTO: 9.4 % — HIGH (ref 0–6)
ESTIMATED AVERAGE GLUCOSE: 137 — SIGNIFICANT CHANGE UP
GLUCOSE SERPL-MCNC: 93 MG/DL — SIGNIFICANT CHANGE UP (ref 70–99)
HCT VFR BLD CALC: 37.9 % — SIGNIFICANT CHANGE UP (ref 34.5–45)
HDLC SERPL-MCNC: 32 MG/DL — LOW
HGB BLD-MCNC: 12.1 G/DL — SIGNIFICANT CHANGE UP (ref 11.5–15.5)
IANC: 1.7 K/UL — LOW (ref 1.8–7.4)
IMM GRANULOCYTES NFR BLD AUTO: 0.2 % — SIGNIFICANT CHANGE UP (ref 0–1.5)
INR BLD: 1.36 RATIO — HIGH (ref 0.88–1.16)
LIPID PNL WITH DIRECT LDL SERPL: 48 MG/DL — SIGNIFICANT CHANGE UP
LYMPHOCYTES # BLD AUTO: 1.63 K/UL — SIGNIFICANT CHANGE UP (ref 1–3.3)
LYMPHOCYTES # BLD AUTO: 38.2 % — SIGNIFICANT CHANGE UP (ref 13–44)
MAGNESIUM SERPL-MCNC: 1.5 MG/DL — LOW (ref 1.6–2.6)
MCHC RBC-ENTMCNC: 28.9 PG — SIGNIFICANT CHANGE UP (ref 27–34)
MCHC RBC-ENTMCNC: 31.9 GM/DL — LOW (ref 32–36)
MCV RBC AUTO: 90.5 FL — SIGNIFICANT CHANGE UP (ref 80–100)
MONOCYTES # BLD AUTO: 0.47 K/UL — SIGNIFICANT CHANGE UP (ref 0–0.9)
MONOCYTES NFR BLD AUTO: 11 % — SIGNIFICANT CHANGE UP (ref 2–14)
NEUTROPHILS # BLD AUTO: 1.7 K/UL — LOW (ref 1.8–7.4)
NEUTROPHILS NFR BLD AUTO: 39.8 % — LOW (ref 43–77)
NON HDL CHOLESTEROL: 65 MG/DL — SIGNIFICANT CHANGE UP
NRBC # BLD: 0 /100 WBCS — SIGNIFICANT CHANGE UP
NRBC # FLD: 0 K/UL — SIGNIFICANT CHANGE UP
PHOSPHATE SERPL-MCNC: 3 MG/DL — SIGNIFICANT CHANGE UP (ref 2.5–4.5)
PLATELET # BLD AUTO: 180 K/UL — SIGNIFICANT CHANGE UP (ref 150–400)
POTASSIUM SERPL-MCNC: 4.2 MMOL/L — SIGNIFICANT CHANGE UP (ref 3.5–5.3)
POTASSIUM SERPL-SCNC: 4.2 MMOL/L — SIGNIFICANT CHANGE UP (ref 3.5–5.3)
PROT SERPL-MCNC: 6.4 G/DL — SIGNIFICANT CHANGE UP (ref 6–8.3)
PROTHROM AB SERPL-ACNC: 15.8 SEC — HIGH (ref 10.5–13.4)
RBC # BLD: 4.19 M/UL — SIGNIFICANT CHANGE UP (ref 3.8–5.2)
RBC # FLD: 13.5 % — SIGNIFICANT CHANGE UP (ref 10.3–14.5)
RH IG SCN BLD-IMP: POSITIVE — SIGNIFICANT CHANGE UP
SODIUM SERPL-SCNC: 140 MMOL/L — SIGNIFICANT CHANGE UP (ref 135–145)
TRIGL SERPL-MCNC: 86 MG/DL — SIGNIFICANT CHANGE UP
WBC # BLD: 4.27 K/UL — SIGNIFICANT CHANGE UP (ref 3.8–10.5)
WBC # FLD AUTO: 4.27 K/UL — SIGNIFICANT CHANGE UP (ref 3.8–10.5)

## 2022-07-22 PROCEDURE — 99223 1ST HOSP IP/OBS HIGH 75: CPT

## 2022-07-22 PROCEDURE — 99233 SBSQ HOSP IP/OBS HIGH 50: CPT

## 2022-07-22 RX ORDER — ASPIRIN/CALCIUM CARB/MAGNESIUM 324 MG
81 TABLET ORAL DAILY
Refills: 0 | Status: DISCONTINUED | OUTPATIENT
Start: 2022-07-22 | End: 2022-07-29

## 2022-07-22 RX ORDER — LOSARTAN/HYDROCHLOROTHIAZIDE 100MG-25MG
1 TABLET ORAL
Qty: 0 | Refills: 0 | DISCHARGE

## 2022-07-22 RX ORDER — LOSARTAN POTASSIUM 100 MG/1
1 TABLET, FILM COATED ORAL
Qty: 0 | Refills: 0 | DISCHARGE

## 2022-07-22 RX ORDER — ATORVASTATIN CALCIUM 80 MG/1
40 TABLET, FILM COATED ORAL AT BEDTIME
Refills: 0 | Status: DISCONTINUED | OUTPATIENT
Start: 2022-07-22 | End: 2022-07-29

## 2022-07-22 RX ORDER — ENOXAPARIN SODIUM 100 MG/ML
110 INJECTION SUBCUTANEOUS EVERY 12 HOURS
Refills: 0 | Status: DISCONTINUED | OUTPATIENT
Start: 2022-07-22 | End: 2022-07-22

## 2022-07-22 RX ORDER — CALCIUM CARBONATE 500(1250)
1 TABLET ORAL
Qty: 0 | Refills: 0 | DISCHARGE

## 2022-07-22 RX ORDER — CHOLECALCIFEROL (VITAMIN D3) 125 MCG
1 CAPSULE ORAL
Qty: 0 | Refills: 0 | DISCHARGE

## 2022-07-22 RX ORDER — HEPARIN SODIUM 5000 [USP'U]/ML
INJECTION INTRAVENOUS; SUBCUTANEOUS
Qty: 25000 | Refills: 0 | Status: DISCONTINUED | OUTPATIENT
Start: 2022-07-22 | End: 2022-07-25

## 2022-07-22 RX ORDER — LOSARTAN POTASSIUM 100 MG/1
25 TABLET, FILM COATED ORAL DAILY
Refills: 0 | Status: DISCONTINUED | OUTPATIENT
Start: 2022-07-22 | End: 2022-07-29

## 2022-07-22 RX ORDER — ASPIRIN/CALCIUM CARB/MAGNESIUM 324 MG
1 TABLET ORAL
Qty: 0 | Refills: 0 | DISCHARGE

## 2022-07-22 RX ORDER — NYSTATIN CREAM 100000 [USP'U]/G
1 CREAM TOPICAL EVERY 12 HOURS
Refills: 0 | Status: DISCONTINUED | OUTPATIENT
Start: 2022-07-22 | End: 2022-07-29

## 2022-07-22 RX ORDER — FLUTICASONE PROPIONATE 50 MCG
1 SPRAY, SUSPENSION NASAL
Qty: 0 | Refills: 0 | DISCHARGE

## 2022-07-22 RX ORDER — METOPROLOL TARTRATE 50 MG
1 TABLET ORAL
Qty: 0 | Refills: 0 | DISCHARGE

## 2022-07-22 RX ORDER — HEPARIN SODIUM 5000 [USP'U]/ML
9000 INJECTION INTRAVENOUS; SUBCUTANEOUS EVERY 6 HOURS
Refills: 0 | Status: DISCONTINUED | OUTPATIENT
Start: 2022-07-22 | End: 2022-07-25

## 2022-07-22 RX ORDER — METOPROLOL TARTRATE 50 MG
25 TABLET ORAL DAILY
Refills: 0 | Status: DISCONTINUED | OUTPATIENT
Start: 2022-07-22 | End: 2022-07-29

## 2022-07-22 RX ORDER — HEPARIN SODIUM 5000 [USP'U]/ML
4000 INJECTION INTRAVENOUS; SUBCUTANEOUS EVERY 6 HOURS
Refills: 0 | Status: DISCONTINUED | OUTPATIENT
Start: 2022-07-22 | End: 2022-07-25

## 2022-07-22 RX ORDER — ASCORBIC ACID 60 MG
1 TABLET,CHEWABLE ORAL
Qty: 0 | Refills: 0 | DISCHARGE

## 2022-07-22 RX ORDER — ACETAMINOPHEN 500 MG
2 TABLET ORAL
Qty: 0 | Refills: 0 | DISCHARGE

## 2022-07-22 RX ORDER — MAGNESIUM SULFATE 500 MG/ML
1 VIAL (ML) INJECTION ONCE
Refills: 0 | Status: COMPLETED | OUTPATIENT
Start: 2022-07-22 | End: 2022-07-22

## 2022-07-22 RX ORDER — ASCORBIC ACID 60 MG
500 TABLET,CHEWABLE ORAL DAILY
Refills: 0 | Status: DISCONTINUED | OUTPATIENT
Start: 2022-07-22 | End: 2022-07-29

## 2022-07-22 RX ORDER — SIMVASTATIN 20 MG/1
1 TABLET, FILM COATED ORAL
Qty: 0 | Refills: 0 | DISCHARGE

## 2022-07-22 RX ORDER — ZINC SULFATE TAB 220 MG (50 MG ZINC EQUIVALENT) 220 (50 ZN) MG
220 TAB ORAL DAILY
Refills: 0 | Status: DISCONTINUED | OUTPATIENT
Start: 2022-07-22 | End: 2022-07-29

## 2022-07-22 RX ORDER — TRAMADOL HYDROCHLORIDE 50 MG/1
50 TABLET ORAL EVERY 6 HOURS
Refills: 0 | Status: DISCONTINUED | OUTPATIENT
Start: 2022-07-22 | End: 2022-07-28

## 2022-07-22 RX ADMIN — Medication 1 TABLET(S): at 17:20

## 2022-07-22 RX ADMIN — Medication 500 MILLIGRAM(S): at 17:19

## 2022-07-22 RX ADMIN — Medication 25 MILLIGRAM(S): at 05:07

## 2022-07-22 RX ADMIN — Medication 100 GRAM(S): at 15:43

## 2022-07-22 RX ADMIN — HEPARIN SODIUM 1900 UNIT(S)/HR: 5000 INJECTION INTRAVENOUS; SUBCUTANEOUS at 17:23

## 2022-07-22 RX ADMIN — TRAMADOL HYDROCHLORIDE 50 MILLIGRAM(S): 50 TABLET ORAL at 04:36

## 2022-07-22 RX ADMIN — HEPARIN SODIUM 1900 UNIT(S)/HR: 5000 INJECTION INTRAVENOUS; SUBCUTANEOUS at 20:11

## 2022-07-22 RX ADMIN — Medication 81 MILLIGRAM(S): at 17:19

## 2022-07-22 RX ADMIN — LOSARTAN POTASSIUM 25 MILLIGRAM(S): 100 TABLET, FILM COATED ORAL at 05:07

## 2022-07-22 RX ADMIN — NYSTATIN CREAM 1 APPLICATION(S): 100000 CREAM TOPICAL at 05:07

## 2022-07-22 RX ADMIN — NYSTATIN CREAM 1 APPLICATION(S): 100000 CREAM TOPICAL at 18:30

## 2022-07-22 RX ADMIN — ZINC SULFATE TAB 220 MG (50 MG ZINC EQUIVALENT) 220 MILLIGRAM(S): 220 (50 ZN) TAB at 17:19

## 2022-07-22 RX ADMIN — ATORVASTATIN CALCIUM 40 MILLIGRAM(S): 80 TABLET, FILM COATED ORAL at 22:12

## 2022-07-22 RX ADMIN — Medication 1 TABLET(S): at 17:19

## 2022-07-22 RX ADMIN — Medication 1 TABLET(S): at 05:07

## 2022-07-22 RX ADMIN — ENOXAPARIN SODIUM 110 MILLIGRAM(S): 100 INJECTION SUBCUTANEOUS at 05:07

## 2022-07-22 NOTE — CONSULT NOTE ADULT - SUBJECTIVE AND OBJECTIVE BOX
House Cardiology Initial Consult      CHIEF COMPLAINT:  LOC    HISTORY OF PRESENT ILLNESS:    82F HTN, CAD by MPI, new onset Afib, OA, spinal stenosis who presented with LOC thought related to new afib.    At [facility] on NST found to have large sized mod severity infarct with waqar-infarct ischemia apical and distal anteroseptal. Unable to obtain radial access and was transferred.    Allergies  Levaquin (Pruritus)  Intolerances	    MEDICATIONS:  aspirin enteric coated 81 milliGRAM(s) Oral daily  losartan 25 milliGRAM(s) Oral daily  metoprolol succinate ER 25 milliGRAM(s) Oral daily  traMADol 50 milliGRAM(s) Oral every 6 hours PRN  atorvastatin 40 milliGRAM(s) Oral at bedtime  ascorbic acid 500 milliGRAM(s) Oral daily  calcium carbonate 1250 mG  + Vitamin D (OsCal 500 + D) 1 Tablet(s) Oral two times a day  multivitamin 1 Tablet(s) Oral daily  nystatin Powder 1 Application(s) Topical every 12 hours  zinc sulfate 220 milliGRAM(s) Oral daily      PAST MEDICAL & SURGICAL HISTORY:  Obesity, morbid  Diabetes  Cellulitis  Bacterial septicemia  Edema leg  S/P hysterectomy  S/P appendectomy  S/P exploratory laparotomy  due to post-op infection from appendectomy as per patient  S/P breast lumpectomy  benign mass    FAMILY HISTORY:  FHx: myocardial infarction (Father)    SOCIAL HISTORY:    [x] Non-smoker  [ ] Smoker  [ ] Alcohol    Review of Systems:  Constitutional: [- ] Fever [- ] Chills [ x] Fatigue [ ] Weight change   HEENT: [ ] Blurred vision [ ] Eye pain [- ] Headache [ ] Runny nose [ ] Sore throat   Respiratory: [- ] Cough [ ] Wheezing [ ] Shortness of breath  Cardiovascular: [- ] Chest Pain [ ] Palpitations [ ] SALVADOR [ ] PND [ ] Orthopnea  Gastrointestinal: [ ] Abdominal Pain [- ] Diarrhea [ ] Constipation [ ] Hemorrhoids [ ] Nausea [ ] Vomiting  Genitourinary: [ ] Nocturia [- ] Dysuria [ ] Incontinence  Extremities: [- ] Swelling [ ] Joint Pain  Neurologic: [ ] Focal deficit [ ] Paresthesias [x ] Syncope  Skin: [ ] Rash [x ] Ecchymoses [ ] Wounds [ ] Lesions  Psychiatry: [ -] Depression [ ] Suicidal/Homicidal ideation [ ] Anxiety [ ] Sleep disturbances  [x ] 10 point review of systems is otherwise negative except as mentioned above            [ ]Unable to obtain    PHYSICAL EXAM:  T(C): 36.9 (07-22-22 @ 10:11), Max: 37.2 (07-21-22 @ 20:40)  HR: 70 (07-22-22 @ 10:11) (69 - 78)  BP: 125/72 (07-22-22 @ 10:11) (124/69 - 139/74)  RR: 18 (07-22-22 @ 10:11) (17 - 18)  SpO2: 98% (07-22-22 @ 10:11) (97% - 98%)  Wt(kg): --  I&O's Summary    22 Jul 2022 07:01  -  22 Jul 2022 16:33  --------------------------------------------------------  IN: 0 mL / OUT: 500 mL / NET: -500 mL    Appearance: No acute distress  HEENT:   periorbital ecchymoses  Cardiovascular: S1 S2, no elevated JVP, no murmurs  Respiratory: Lungs clear to auscultation, good air movement  Psychiatry: A & O x 3, Mood & affect appropriate  Gastrointestinal:  soft nt	  Skin: no cyanosis	  Neurologic: grossly non-focal    LABS:	 	  CBC Full  -  ( 22 Jul 2022 01:12 )  WBC Count : 4.27 K/uL  Hemoglobin : 12.1 g/dL  Hematocrit : 37.9 %  Platelet Count - Automated : 180 K/uL    07-22  140  |  107  |  19  ----------------------------<  93  4.2   |  21<L>  |  0.78    Ca    9.1      22 Jul 2022 01:12  Phos  3.0     07-22  Mg     1.50     07-22    TPro  6.4  /  Alb  3.0<L>  /  TBili  0.4  /  DBili  x   /  AST  32  /  ALT  15  /  AlkPhos  95  07-22    ECG:  	R, LAFB, RBBB  RADIOLOGY:  OTHER: 	    PREVIOUS DIAGNOSTIC TESTING:    [ ] Echocardiogram: nl EF  [ ] Catheterization:  [ ] Stress Test:

## 2022-07-22 NOTE — H&P ADULT - ASSESSMENT
81 y/o F with new onset afib, CAD, HTN, OA, spinal stenosis who presents to OSH for syncope transferred to [facility] for EP evaluation

## 2022-07-22 NOTE — H&P ADULT - PROBLEM SELECTOR PLAN 3
-reports chronic lower back pain with associated weakness in the legs, made worse by recent fall. Denies acute worsening   -proximal weakness likely from deconditioning, distal strength intact.  -c/w tramadol for pain control  -PT evaluation

## 2022-07-22 NOTE — H&P ADULT - PROBLEM SELECTOR PLAN 2
-Pending EP eval given syncopal episode, may need PPM   -CHADSVASc score of at least 4. Switched from eliquis to lovenox given likely need for procedure   -c/w metoprolol for rate control  -ischemic workup at OSH significant for large sized mod severity infarct with waqar-infarct ischemia apical and distal anteroseptal. Will need to re-attempt cath given unable to obtain access at OSH  -Repeat EKG  -Cards and EP c/s in AM  -monitor on tele -Pending EP eval given syncopal episode, may need PPM, afib ablation  -CHADSVASc score of at least 4. Switched from eliquis to lovenox given likely need for procedure   -c/w metoprolol for rate control  -ischemic workup at OSH significant for large sized mod severity infarct with waqar-infarct ischemia apical and distal anteroseptal. Will need to re-attempt cath given unable to obtain access at OSH  -Repeat EKG  -Cards and EP c/s in AM  -monitor on tele

## 2022-07-22 NOTE — PATIENT PROFILE ADULT - LEGAL HELP
"Requested Prescriptions   Pending Prescriptions Disp Refills     atorvastatin (LIPITOR) 10 MG tablet [Pharmacy Med Name: ATORVASTATIN 10 MG TABLET]  Last Written Prescription Date:  04/18/19  Last Fill Quantity: 90,  # refills: 3   Last office visit: 6/29/2018 with prescribing provider:  MALIA Roberts   Future Office Visit:     90 tablet 3     Sig: TAKE 1 TABLET BY MOUTH EVERY DAY       Statins Protocol Failed - 7/13/2019  8:41 AM        Failed - LDL on file in past 12 months     Recent Labs   Lab Test 06/29/18  1129   *             Passed - No abnormal creatine kinase in past 12 months     No lab results found.             Passed - Recent (12 mo) or future (30 days) visit within the authorizing provider's specialty     Patient had office visit in the last 12 months or has a visit in the next 30 days with authorizing provider or within the authorizing provider's specialty.  See \"Patient Info\" tab in inbasket, or \"Choose Columns\" in Meds & Orders section of the refill encounter.              Passed - Medication is active on med list        Passed - Patient is age 18 or older        metoprolol tartrate (LOPRESSOR) 100 MG tablet [Pharmacy Med Name: METOPROLOL TARTRATE 100 MG TAB]  Last Written Prescription Date:  04/13/19  Last Fill Quantity: 180,  # refills: 3   Last office visit: 6/29/2018 with prescribing provider:  MALIA Roberts   Future Office Visit:     180 tablet 3     Sig: TAKE 1 TABLET (100 MG) BY MOUTH 2 TIMES DAILY       Beta-Blockers Protocol Failed - 7/13/2019  8:41 AM        Failed - Blood pressure under 140/90 in past 12 months     BP Readings from Last 3 Encounters:   06/29/18 124/84   10/23/17 (!) 140/99   09/18/17 (!) 146/102                 Passed - Patient is age 6 or older        Passed - Recent (12 mo) or future (30 days) visit within the authorizing provider's specialty     Patient had office visit in the last 12 months or has a visit in the next 30 days with authorizing provider or within the " "authorizing provider's specialty.  See \"Patient Info\" tab in inbasket, or \"Choose Columns\" in Meds & Orders section of the refill encounter.              Passed - Medication is active on med list          " no

## 2022-07-22 NOTE — CONSULT NOTE ADULT - ATTENDING COMMENTS
81 y/o female with hx of CAD, HTN, OA, spinal stenosis who presented to [facility] for syncope and found to have new afib. She has bifascicular block on EKG. Tele demonstrated episodes of AF with regular rate at [facility], likely consistent with CHB. She is pending a LHC. Plan for PPM post [facility].

## 2022-07-22 NOTE — PROGRESS NOTE ADULT - PROBLEM SELECTOR PLAN 2
-Pending EP eval given syncopal episode, may need PPM, afib ablation  -CHADSVASc score of at least 4. Switched from eliquis to lovenox given likely need for procedure   -c/w metoprolol for rate control  -ischemic workup at OSH significant for large sized mod severity infarct with waqar-infarct ischemia apical and distal anteroseptal. Plan for cath today  -Repeat EKG  -Appreciate cardiology and EP recs  -monitor on tele

## 2022-07-22 NOTE — H&P ADULT - PROBLEM SELECTOR PLAN 1
-Worked up at OSH. TTE wnl. EKG consistent with new onset afib.  -Syncope thought to be secondary to afib with RVR vs conversion pause   -EP eval pending regarding need for PPM  -monitor on telemetry

## 2022-07-22 NOTE — H&P ADULT - NSHPPHYSICALEXAM_GEN_ALL_CORE
GENERAL APPEARANCE: Well developed, well nourished, alert and cooperative. NAD.   HEENT:  PERRL, EOMI. External auditory canals normal, hearing grossly intact. Periorbital ecchymosis, ecchymosis on forehead with a palpable hematoma   NECK: Neck supple, non-tender without lymphadenopathy, masses or thyromegaly.  CARDIAC: Normal S1 and S2. No S3, S4 or murmurs. Rhythm is irregularly irregular.  LUNGS: Clear to auscultation and percussion without rales, rhonchi, wheezing or diminished breath sounds anteriorly   ABDOMEN: Positive bowel sounds. Soft, nondistended, nontender. No guarding or rebound.   MUSCULOSKELETAL: ROM intact extremities. No joint erythema or tenderness.   BACK: unable to exam due to body habitus   EXTREMITIES: No significant deformity or joint abnormality. No edema. Peripheral pulses intact. No varicosities.  NEUROLOGICAL: CN II-XII intact. Strength and sensation symmetric and intact throughout. 4+/5 proximal strength in LE, 5/5 distal strength in LE   SKIN: venous stasis in b/l LE   PSYCHIATRIC: AOx3.Normal affect and behavior.

## 2022-07-22 NOTE — PROGRESS NOTE ADULT - ASSESSMENT
81 y/o F with new onset afib, CAD, HTN, OA, spinal stenosis who presents to OSH for syncope transferred to [facility] for EP evaluation

## 2022-07-22 NOTE — CONSULT NOTE ADULT - SUBJECTIVE AND OBJECTIVE BOX
Patient seen and evaluated at bedside    Chief Complaint: syncope    HPI:  83 y/o F with new onset afib, CAD, HTN, OA, spinal stenosis who presents to OSH for syncope transferred to [facility] for EP evaluation. Pt reports she was standing in her kitchen when she felt dizzy and fell forward. Pt had a similar syncopal episode only two weeks prior. Syncope though to be related to new onset afib. NST with large sized mod severity infarct with waqar-infarct ischemia apical and distal anteroseptal. Cath was attempted during admission but unable to obtain access therefore transferred to [facility]. Currently pt reports lower back pain secondary to known spinal stenosis. She denies lightheadedness, chest pain, abdominal pain, N/V or SOB. (2022 01:58)    PMHx:   Obesity, morbid  Diabetes  Cellulitis  Bacterial septicemia  Edema leg    PSHx:   S/P hysterectomy  S/P appendectomy  S/P exploratory laparotomy  S/P breast lumpectomy    Allergies:  Levaquin (Pruritus)    Current Medications:   ascorbic acid 500 milliGRAM(s) Oral daily  aspirin enteric coated 81 milliGRAM(s) Oral daily  atorvastatin 40 milliGRAM(s) Oral at bedtime  calcium carbonate 1250 mG  + Vitamin D (OsCal 500 + D) 1 Tablet(s) Oral two times a day  enoxaparin Injectable 110 milliGRAM(s) SubCutaneous every 12 hours  losartan 25 milliGRAM(s) Oral daily  magnesium sulfate  IVPB 1 Gram(s) IV Intermittent once  metoprolol succinate ER 25 milliGRAM(s) Oral daily  multivitamin 1 Tablet(s) Oral daily  nystatin Powder 1 Application(s) Topical every 12 hours  traMADol 50 milliGRAM(s) Oral every 6 hours PRN  zinc sulfate 220 milliGRAM(s) Oral daily    FAMILY HISTORY:  FHx: myocardial infarction (Father)    REVIEW OF SYSTEMS:  Constitutional:     [x ] negative [ ] fevers [ ] chills [ ] weight loss [ ] weight gain  HEENT:                  [x ] negative [ ] dry eyes [ ] eye irritation [ ] postnasal drip [ ] nasal congestion  CV:                         [ x] negative  [ ] chest pain [ ] orthopnea [ ] palpitations [ ] murmur  Resp:                     [x ] negative [ ] cough [ ] shortness of breath [ ] dyspnea [ ] wheezing [ ] sputum [ ]hemoptysis  GI:                          [ x] negative [ ] nausea [ ] vomiting [ ] diarrhea [ ] constipation [ ] abd pain [ ] dysphagia   :                        [ x] negative [ ] dysuria [ ] nocturia [ ] hematuria [ ] increased urinary frequency  Musculoskeletal: [x ] negative [ ] back pain [ ] myalgias [ ] arthralgias [ ] fracture  Skin:                       [ x] negative [ ] rash [ ] itch  Neurological:        [ x] negative [ ] headache [ ] dizziness [ ] syncope [ ] weakness [ ] numbness  Psychiatric:           [ x] negative [ ] anxiety [ ] depression  Endocrine:            [ x] negative [ ] diabetes [ ] thyroid problem  Heme/Lymph:      [ x] negative [ ] anemia [ ] bleeding problem  Allergic/Immune: [ x] negative [ ] itchy eyes [ ] nasal discharge [ ] hives [ ] angioedema    [ x] All other systems negative  [ ] Unable to assess ROS due to    Physical Exam:  T(F): 98 (), Max: 98.9 ()  HR: 69 () (69 - 78)  BP: 124/69 () (124/69 - 139/74)  RR: 17 ()  SpO2: 97% ()  GENERAL: No acute distress, well-developed  HEAD:  Atraumatic, Normocephalic  ENT: EOMI, PERRLA, conjunctiva and sclera clear, Neck supple, No JVD, moist mucosa  CHEST/LUNG: Clear to auscultation bilaterally; No wheeze, equal breath sounds bilaterally   BACK: No spinal tenderness  HEART: Regular rate and rhythm; No murmurs, rubs, or gallops  ABDOMEN: Soft, Nontender, Nondistended; Bowel sounds present  EXTREMITIES:  No clubbing, cyanosis, or edema  PSYCH: Nl behavior, nl affect  NEUROLOGY: AAOx3, non-focal, cranial nerves intact  SKIN: Normal color, No rashes or lesions  LINES:    Cardiovascular Diagnostic Testing:    ECG: Personally reviewed:    Imaging:    CXR: Personally reviewed    Labs: Personally reviewed                        12.  )-----------( 180      ( 2022 01:12 )             37.9         140  |  107  |  19  ----------------------------<  93  4.2   |  21<L>  |  0.78    Ca    9.1      2022 01:12  Phos  3.0     -  Mg     1.50     -    TPro  6.4  /  Alb  3.0<L>  /  TBili  0.4  /  DBili  x   /  AST  32  /  ALT  15  /  AlkPhos  95  -    PT/INR - ( 2022 01:12 )   PT: 15.8 sec;   INR: 1.36 ratio         PTT - ( 2022 01:12 )  PTT:42.9 sec    Total Cholesterol: 97  LDL: --  HDL: 32  T       Patient seen and evaluated at bedside    Chief Complaint: syncope    HPI:  81 y/o F with new onset afib, CAD, HTN, OA, spinal stenosis who presents to OSH for syncope transferred to [facility] for EP evaluation. Pt reports she was standing in her kitchen when she felt dizzy and fell forward. Pt had a similar syncopal episode only two weeks prior. Syncope though to be related to new onset afib. NST with large sized mod severity infarct with waqar-infarct ischemia apical and distal anteroseptal. Cath was attempted during admission but unable to obtain access therefore transferred to [facility]. Currently pt reports lower back pain secondary to known spinal stenosis. She denies lightheadedness, chest pain, abdominal pain, N/V or SOB. (2022 01:58)    PMHx:   Obesity, morbid  Diabetes  Cellulitis  Bacterial septicemia  Edema leg    PSHx:   S/P hysterectomy  S/P appendectomy  S/P exploratory laparotomy  S/P breast lumpectomy    Allergies:  Levaquin (Pruritus)    Current Medications:   ascorbic acid 500 milliGRAM(s) Oral daily  aspirin enteric coated 81 milliGRAM(s) Oral daily  atorvastatin 40 milliGRAM(s) Oral at bedtime  calcium carbonate 1250 mG  + Vitamin D (OsCal 500 + D) 1 Tablet(s) Oral two times a day  enoxaparin Injectable 110 milliGRAM(s) SubCutaneous every 12 hours  losartan 25 milliGRAM(s) Oral daily  magnesium sulfate  IVPB 1 Gram(s) IV Intermittent once  metoprolol succinate ER 25 milliGRAM(s) Oral daily  multivitamin 1 Tablet(s) Oral daily  nystatin Powder 1 Application(s) Topical every 12 hours  traMADol 50 milliGRAM(s) Oral every 6 hours PRN  zinc sulfate 220 milliGRAM(s) Oral daily    FAMILY HISTORY:  FHx: myocardial infarction (Father)    REVIEW OF SYSTEMS:  Constitutional:     [x ] negative [ ] fevers [ ] chills [ ] weight loss [ ] weight gain  HEENT:                  [x ] negative [ ] dry eyes [ ] eye irritation [ ] postnasal drip [ ] nasal congestion  CV:                         [ x] negative  [ ] chest pain [ ] orthopnea [ ] palpitations [ ] murmur  Resp:                     [x ] negative [ ] cough [ ] shortness of breath [ ] dyspnea [ ] wheezing [ ] sputum [ ]hemoptysis  GI:                          [ x] negative [ ] nausea [ ] vomiting [ ] diarrhea [ ] constipation [ ] abd pain [ ] dysphagia   :                        [ x] negative [ ] dysuria [ ] nocturia [ ] hematuria [ ] increased urinary frequency  Musculoskeletal: [x ] negative [ ] back pain [ ] myalgias [ ] arthralgias [ ] fracture  Skin:                       [ x] negative [ ] rash [ ] itch  Neurological:        [ x] negative [ ] headache [ ] dizziness [ ] syncope [ ] weakness [ ] numbness  Psychiatric:           [ x] negative [ ] anxiety [ ] depression  Endocrine:            [ x] negative [ ] diabetes [ ] thyroid problem  Heme/Lymph:      [ x] negative [ ] anemia [ ] bleeding problem  Allergic/Immune: [ x] negative [ ] itchy eyes [ ] nasal discharge [ ] hives [ ] angioedema    [ x] All other systems negative  [ ] Unable to assess ROS due to    Physical Exam:  T(F): 98 (), Max: 98.9 ()  HR: 69 () (69 - 78)  BP: 124/69 () (124/69 - 139/74)  RR: 17 ()  SpO2: 97% ()  GENERAL: No acute distress, well-developed  HEAD:  Atraumatic, Normocephalic  ENT: EOMI, PERRLA, conjunctiva and sclera clear, Neck supple, No JVD, moist mucosa  CHEST/LUNG: Clear to auscultation bilaterally; No wheeze, equal breath sounds bilaterally   BACK: No spinal tenderness  HEART: irregular rhythm; No murmurs, rubs, or gallops  ABDOMEN: Soft, Nontender, Nondistended; Bowel sounds present  EXTREMITIES:  No clubbing, cyanosis, or edema  PSYCH: Nl behavior, nl affect  NEUROLOGY: AAOx3, non-focal, cranial nerves intact  SKIN: Normal color, No rashes or lesions  LINES:    Cardiovascular Diagnostic Testing:    ECG: Personally reviewed:    Imaging:    CXR: Personally reviewed    Labs: Personally reviewed                        12.  )-----------( 180      ( 2022 01:12 )             37.9         140  |  107  |  19  ----------------------------<  93  4.2   |  21<L>  |  0.78    Ca    9.1      2022 01:12  Phos  3.0       Mg     1.50         TPro  6.4  /  Alb  3.0<L>  /  TBili  0.4  /  DBili  x   /  AST  32  /  ALT  15  /  AlkPhos  95      PT/INR - ( 2022 01:12 )   PT: 15.8 sec;   INR: 1.36 ratio         PTT - ( 2022 01:12 )  PTT:42.9 sec    Total Cholesterol: 97  LDL: --  HDL: 32  T

## 2022-07-22 NOTE — H&P ADULT - NSHPLABSRESULTS_GEN_ALL_CORE
(07-22 @ 01:12)                      12.1  4.27 )-----------( 180                 37.9    Neutrophils = 1.70 (39.8%)  Lymphocytes = 1.63 (38.2%)  Eosinophils = 0.40 (9.4%)  Basophils = 0.06 (1.4%)  Monocytes = 0.47 (11.0%)  Bands = --%          ( 22 Jul 2022 01:12 )   PT: 15.8 sec;   INR: 1.36 ratio;       PTT:42.9 sec      Labs and imaging from OSH reviewed    TTE: LV function 55-60%. Normal LV systolic function. Normal right ventricular size and systolic function. Dilated left atrium   Prior EKGs reviewed: Afib 1st degree AV block, RBBV, LAFB, inferolat q waves

## 2022-07-22 NOTE — CONSULT NOTE ADULT - ASSESSMENT
81 yo F with hx of CAD, HTN, OA, spinal stenosis who presented to [facility] for syncope and found to have new afib, transferred to [facility] for EP eval.    #Syncope  - EKG in [facility] showed afib with regular QRS (possible HB), LAFB, RBBB  - please obtain EKG here  - telemetry overnight showed longest pause of 1.9 sec in afib, now with irregularly irregular rhythm  - TTE in [facility] showed EF 55-60%, dilated LA, normal RV  - NST showed large mod infarct with waqar-infarct ischemia in apical and distal anteroseptal walls, cath was attempted but unable to obtain access  - will need cath prior to PPM   - telemetry  - keep K > 4, Mg > 2    #Afib  - CHADSVASC of 4, will need AC, please switch to heparin gtt   - c/w metop succinate 25 daily  
LOC thought 2/2 Afib pauses  abnl nuclear stress test      cardiac catheterization planned 7/25/22 with Dr. Velarde prior to EP intervention. please hold enoxaparin after 21:00 the night prior or consider heparin gtt

## 2022-07-22 NOTE — PATIENT PROFILE ADULT - FALL HARM RISK - HARM RISK INTERVENTIONS

## 2022-07-22 NOTE — H&P ADULT - NSHPREVIEWOFSYSTEMS_GEN_ALL_CORE
CONSTITUTIONAL:  No weight loss, fever, chills, weakness or fatigue.  HEENT:  Eyes:  No visual loss, blurred vision, double vision or yellow sclerae. Ears, Nose, Throat:  No hearing loss, sneezing, congestion, runny nose or sore throat.  SKIN:  +Rash  CARDIOVASCULAR:  No chest pain, chest pressure or chest discomfort. No palpitations or edema.  RESPIRATORY:  No shortness of breath, cough or sputum.  GASTROINTESTINAL:  No anorexia, nausea, vomiting or diarrhea. No abdominal pain or blood.  GENITOURINARY:  Denies hematuria, dysuria.   NEUROLOGICAL:  No headache, dizziness, syncope, paralysis, ataxia, numbness or tingling in the extremities. No change in bowel or bladder control.  MUSCULOSKELETAL:  +back pain   HEMATOLOGIC:  +bruising   PSYCHIATRIC:  No history of depression or anxiety.  ENDOCRINOLOGIC:  No reports of sweating, cold or heat intolerance. No polyuria or polydipsia.  ALLERGIES:  No history of asthma, hives, eczema or rhinitis.

## 2022-07-22 NOTE — H&P ADULT - NSICDXPASTSURGICALHX_GEN_ALL_CORE_FT
PAST SURGICAL HISTORY:  S/P appendectomy     S/P breast lumpectomy benign mass    S/P exploratory laparotomy due to post-op infection from appendectomy as per patient    S/P hysterectomy

## 2022-07-22 NOTE — H&P ADULT - HISTORY OF PRESENT ILLNESS
81 y/o F with new onset afib, CAD, HTN, OA, spinal stenosis who presents to OSH for syncope transferred to [facility] for EP evaluation. Pt reports she was standing in her kitchen when she felt dizzy and fell forward. Pt had a similar syncopal episode only two weeks prior. Syncope though to be related to new onset afib. NST with large sized mod severity infarct with waqar-infarct ischemia apical and distal anteroseptal. Cath was attempted during admission but unable to obtain access therefore transferred to [facility]. Currently pt reports lower back pain secondary to known spinal stenosis. She denies lightheadedness, chest pain, abdominal pain, N/V or SOB.

## 2022-07-22 NOTE — H&P ADULT - NSICDXPASTMEDICALHX_GEN_ALL_CORE_FT
PAST MEDICAL HISTORY:  Bacterial septicemia     Cellulitis     Diabetes     Edema leg     Obesity, morbid

## 2022-07-22 NOTE — PROGRESS NOTE ADULT - PROBLEM SELECTOR PLAN 1
-Worked up at OSH. TTE wnl. EKG consistent with new onset afib.  -Syncope thought to be secondary to afib with RVR vs conversion pause   -appreciate EP recs, pt pending cardiac cath prior to further EP workup  -monitor on telemetry

## 2022-07-23 LAB
ANION GAP SERPL CALC-SCNC: 14 MMOL/L — SIGNIFICANT CHANGE UP (ref 7–14)
APTT BLD: >200 SEC — CRITICAL HIGH (ref 27–36.3)
APTT BLD: >200 SEC — CRITICAL HIGH (ref 27–36.3)
APTT BLD: >200 SEC — SIGNIFICANT CHANGE UP (ref 27–36.3)
BUN SERPL-MCNC: 19 MG/DL — SIGNIFICANT CHANGE UP (ref 7–23)
CALCIUM SERPL-MCNC: 9.2 MG/DL — SIGNIFICANT CHANGE UP (ref 8.4–10.5)
CHLORIDE SERPL-SCNC: 103 MMOL/L — SIGNIFICANT CHANGE UP (ref 98–107)
CO2 SERPL-SCNC: 20 MMOL/L — LOW (ref 22–31)
CREAT SERPL-MCNC: 0.71 MG/DL — SIGNIFICANT CHANGE UP (ref 0.5–1.3)
EGFR: 86 ML/MIN/1.73M2 — SIGNIFICANT CHANGE UP
GLUCOSE SERPL-MCNC: 98 MG/DL — SIGNIFICANT CHANGE UP (ref 70–99)
HCT VFR BLD CALC: 36.6 % — SIGNIFICANT CHANGE UP (ref 34.5–45)
HCT VFR BLD CALC: 37.4 % — SIGNIFICANT CHANGE UP (ref 34.5–45)
HGB BLD-MCNC: 12.2 G/DL — SIGNIFICANT CHANGE UP (ref 11.5–15.5)
HGB BLD-MCNC: 12.5 G/DL — SIGNIFICANT CHANGE UP (ref 11.5–15.5)
MAGNESIUM SERPL-MCNC: 1.6 MG/DL — SIGNIFICANT CHANGE UP (ref 1.6–2.6)
MCHC RBC-ENTMCNC: 29.3 PG — SIGNIFICANT CHANGE UP (ref 27–34)
MCHC RBC-ENTMCNC: 29.6 PG — SIGNIFICANT CHANGE UP (ref 27–34)
MCHC RBC-ENTMCNC: 33.3 GM/DL — SIGNIFICANT CHANGE UP (ref 32–36)
MCHC RBC-ENTMCNC: 33.4 GM/DL — SIGNIFICANT CHANGE UP (ref 32–36)
MCV RBC AUTO: 87.8 FL — SIGNIFICANT CHANGE UP (ref 80–100)
MCV RBC AUTO: 88.4 FL — SIGNIFICANT CHANGE UP (ref 80–100)
NRBC # BLD: 0 /100 WBCS — SIGNIFICANT CHANGE UP
NRBC # BLD: 0 /100 WBCS — SIGNIFICANT CHANGE UP
NRBC # FLD: 0 K/UL — SIGNIFICANT CHANGE UP
NRBC # FLD: 0 K/UL — SIGNIFICANT CHANGE UP
PHOSPHATE SERPL-MCNC: 3.3 MG/DL — SIGNIFICANT CHANGE UP (ref 2.5–4.5)
PLATELET # BLD AUTO: 180 K/UL — SIGNIFICANT CHANGE UP (ref 150–400)
PLATELET # BLD AUTO: 190 K/UL — SIGNIFICANT CHANGE UP (ref 150–400)
POTASSIUM SERPL-MCNC: 4.7 MMOL/L — SIGNIFICANT CHANGE UP (ref 3.5–5.3)
POTASSIUM SERPL-SCNC: 4.7 MMOL/L — SIGNIFICANT CHANGE UP (ref 3.5–5.3)
RBC # BLD: 4.17 M/UL — SIGNIFICANT CHANGE UP (ref 3.8–5.2)
RBC # BLD: 4.23 M/UL — SIGNIFICANT CHANGE UP (ref 3.8–5.2)
RBC # FLD: 13.7 % — SIGNIFICANT CHANGE UP (ref 10.3–14.5)
RBC # FLD: 14 % — SIGNIFICANT CHANGE UP (ref 10.3–14.5)
SARS-COV-2 RNA SPEC QL NAA+PROBE: SIGNIFICANT CHANGE UP
SODIUM SERPL-SCNC: 137 MMOL/L — SIGNIFICANT CHANGE UP (ref 135–145)
WBC # BLD: 4.5 K/UL — SIGNIFICANT CHANGE UP (ref 3.8–10.5)
WBC # BLD: 4.7 K/UL — SIGNIFICANT CHANGE UP (ref 3.8–10.5)
WBC # FLD AUTO: 4.5 K/UL — SIGNIFICANT CHANGE UP (ref 3.8–10.5)
WBC # FLD AUTO: 4.7 K/UL — SIGNIFICANT CHANGE UP (ref 3.8–10.5)

## 2022-07-23 PROCEDURE — 99232 SBSQ HOSP IP/OBS MODERATE 35: CPT

## 2022-07-23 RX ORDER — ACETAMINOPHEN 500 MG
650 TABLET ORAL ONCE
Refills: 0 | Status: COMPLETED | OUTPATIENT
Start: 2022-07-23 | End: 2022-07-23

## 2022-07-23 RX ADMIN — Medication 1 TABLET(S): at 05:44

## 2022-07-23 RX ADMIN — HEPARIN SODIUM 0 UNIT(S)/HR: 5000 INJECTION INTRAVENOUS; SUBCUTANEOUS at 00:51

## 2022-07-23 RX ADMIN — NYSTATIN CREAM 1 APPLICATION(S): 100000 CREAM TOPICAL at 17:15

## 2022-07-23 RX ADMIN — HEPARIN SODIUM 1000 UNIT(S)/HR: 5000 INJECTION INTRAVENOUS; SUBCUTANEOUS at 17:39

## 2022-07-23 RX ADMIN — Medication 25 MILLIGRAM(S): at 05:44

## 2022-07-23 RX ADMIN — ATORVASTATIN CALCIUM 40 MILLIGRAM(S): 80 TABLET, FILM COATED ORAL at 22:13

## 2022-07-23 RX ADMIN — Medication 500 MILLIGRAM(S): at 11:36

## 2022-07-23 RX ADMIN — TRAMADOL HYDROCHLORIDE 50 MILLIGRAM(S): 50 TABLET ORAL at 01:15

## 2022-07-23 RX ADMIN — NYSTATIN CREAM 1 APPLICATION(S): 100000 CREAM TOPICAL at 05:44

## 2022-07-23 RX ADMIN — HEPARIN SODIUM 1000 UNIT(S)/HR: 5000 INJECTION INTRAVENOUS; SUBCUTANEOUS at 20:36

## 2022-07-23 RX ADMIN — LOSARTAN POTASSIUM 25 MILLIGRAM(S): 100 TABLET, FILM COATED ORAL at 05:44

## 2022-07-23 RX ADMIN — Medication 1 TABLET(S): at 11:36

## 2022-07-23 RX ADMIN — HEPARIN SODIUM 0 UNIT(S)/HR: 5000 INJECTION INTRAVENOUS; SUBCUTANEOUS at 16:34

## 2022-07-23 RX ADMIN — HEPARIN SODIUM 1300 UNIT(S)/HR: 5000 INJECTION INTRAVENOUS; SUBCUTANEOUS at 09:53

## 2022-07-23 RX ADMIN — HEPARIN SODIUM 0 UNIT(S)/HR: 5000 INJECTION INTRAVENOUS; SUBCUTANEOUS at 08:50

## 2022-07-23 RX ADMIN — TRAMADOL HYDROCHLORIDE 50 MILLIGRAM(S): 50 TABLET ORAL at 00:44

## 2022-07-23 RX ADMIN — HEPARIN SODIUM 1600 UNIT(S)/HR: 5000 INJECTION INTRAVENOUS; SUBCUTANEOUS at 07:44

## 2022-07-23 RX ADMIN — Medication 81 MILLIGRAM(S): at 11:36

## 2022-07-23 RX ADMIN — HEPARIN SODIUM 1600 UNIT(S)/HR: 5000 INJECTION INTRAVENOUS; SUBCUTANEOUS at 01:56

## 2022-07-23 RX ADMIN — ZINC SULFATE TAB 220 MG (50 MG ZINC EQUIVALENT) 220 MILLIGRAM(S): 220 (50 ZN) TAB at 11:36

## 2022-07-23 RX ADMIN — Medication 1 TABLET(S): at 17:21

## 2022-07-23 NOTE — PROGRESS NOTE ADULT - PROBLEM SELECTOR PLAN 1
-Worked up at OSH. TTE wnl. EKG consistent with new onset afib.  -Syncope thought to be secondary to afib with RVR vs conversion pause   -appreciate EP recs, pt pending cardiac cath prior to further EP workup  -monitor on telemetry  -[facility] on 7/25/22

## 2022-07-23 NOTE — PHYSICAL THERAPY INITIAL EVALUATION ADULT - PATIENT PROFILE REVIEW, REHAB EVAL
Activity - ambulate with assistance. Spoke with EMANUEL Xiong prior to session, pt cleared for PT evaluation./yes

## 2022-07-23 NOTE — PROGRESS NOTE ADULT - ASSESSMENT
83 y/o F with new onset afib, CAD, HTN, OA, spinal stenosis who presents to OSH for syncope transferred to [facility] for EP evaluation

## 2022-07-23 NOTE — SWALLOW BEDSIDE ASSESSMENT ADULT - COMMENTS
Hospitalist note 7/22 "83 y/o F with new onset afib, CAD, HTN, OA, spinal stenosis who presents to OSH for syncope transferred to [facility] for EP evaluation".    No CXR during this admission    Patient seen at bedside this afternoon for an initial assessment of the swallow function, at which time patient was alert and cooperative. patient able to follow directives and partake in conversational discourse. patient denies difficulty with eating/drinking.

## 2022-07-23 NOTE — PHYSICAL THERAPY INITIAL EVALUATION ADULT - PERTINENT HX OF CURRENT PROBLEM, REHAB EVAL
82 year old female who presents to OSH for syncope transferred to [facility] for EP evaluation. Pt reports she was standing in her kitchen when she felt dizzy and fell forward. Pt had a similar syncopal episode only two weeks prior. Syncope though to be related to new onset afib.

## 2022-07-23 NOTE — SWALLOW BEDSIDE ASSESSMENT ADULT - SWALLOW EVAL: CURRENT DIET
Soft and bite sized solids with thin liquids; NPO for test starting 7/24- Received clearance for swallow eval from [facility]

## 2022-07-23 NOTE — SWALLOW BEDSIDE ASSESSMENT ADULT - ASR SWALLOW RECOMMEND DIAG
Objective testing NOT warranted given no overt signs of penetration/aspiration and no CXR during this admission

## 2022-07-24 LAB
ANION GAP SERPL CALC-SCNC: 9 MMOL/L — SIGNIFICANT CHANGE UP (ref 7–14)
APTT BLD: 69.8 SEC — HIGH (ref 27–36.3)
APTT BLD: 89 SEC — HIGH (ref 27–36.3)
BUN SERPL-MCNC: 22 MG/DL — SIGNIFICANT CHANGE UP (ref 7–23)
CALCIUM SERPL-MCNC: 9.2 MG/DL — SIGNIFICANT CHANGE UP (ref 8.4–10.5)
CHLORIDE SERPL-SCNC: 103 MMOL/L — SIGNIFICANT CHANGE UP (ref 98–107)
CO2 SERPL-SCNC: 22 MMOL/L — SIGNIFICANT CHANGE UP (ref 22–31)
CREAT SERPL-MCNC: 0.81 MG/DL — SIGNIFICANT CHANGE UP (ref 0.5–1.3)
EGFR: 73 ML/MIN/1.73M2 — SIGNIFICANT CHANGE UP
GLUCOSE SERPL-MCNC: 85 MG/DL — SIGNIFICANT CHANGE UP (ref 70–99)
HCT VFR BLD CALC: 36.4 % — SIGNIFICANT CHANGE UP (ref 34.5–45)
HGB BLD-MCNC: 12.3 G/DL — SIGNIFICANT CHANGE UP (ref 11.5–15.5)
MAGNESIUM SERPL-MCNC: 1.6 MG/DL — SIGNIFICANT CHANGE UP (ref 1.6–2.6)
MCHC RBC-ENTMCNC: 29.9 PG — SIGNIFICANT CHANGE UP (ref 27–34)
MCHC RBC-ENTMCNC: 33.8 GM/DL — SIGNIFICANT CHANGE UP (ref 32–36)
MCV RBC AUTO: 88.6 FL — SIGNIFICANT CHANGE UP (ref 80–100)
NRBC # BLD: 0 /100 WBCS — SIGNIFICANT CHANGE UP
NRBC # FLD: 0 K/UL — SIGNIFICANT CHANGE UP
PHOSPHATE SERPL-MCNC: 3.3 MG/DL — SIGNIFICANT CHANGE UP (ref 2.5–4.5)
PLATELET # BLD AUTO: 179 K/UL — SIGNIFICANT CHANGE UP (ref 150–400)
POTASSIUM SERPL-MCNC: 3.9 MMOL/L — SIGNIFICANT CHANGE UP (ref 3.5–5.3)
POTASSIUM SERPL-SCNC: 3.9 MMOL/L — SIGNIFICANT CHANGE UP (ref 3.5–5.3)
RBC # BLD: 4.11 M/UL — SIGNIFICANT CHANGE UP (ref 3.8–5.2)
RBC # FLD: 14 % — SIGNIFICANT CHANGE UP (ref 10.3–14.5)
SODIUM SERPL-SCNC: 134 MMOL/L — LOW (ref 135–145)
WBC # BLD: 4.76 K/UL — SIGNIFICANT CHANGE UP (ref 3.8–10.5)
WBC # FLD AUTO: 4.76 K/UL — SIGNIFICANT CHANGE UP (ref 3.8–10.5)

## 2022-07-24 PROCEDURE — 93306 TTE W/DOPPLER COMPLETE: CPT | Mod: 26

## 2022-07-24 PROCEDURE — 99232 SBSQ HOSP IP/OBS MODERATE 35: CPT

## 2022-07-24 RX ORDER — ACETAMINOPHEN 500 MG
650 TABLET ORAL EVERY 6 HOURS
Refills: 0 | Status: DISCONTINUED | OUTPATIENT
Start: 2022-07-24 | End: 2022-07-29

## 2022-07-24 RX ADMIN — ZINC SULFATE TAB 220 MG (50 MG ZINC EQUIVALENT) 220 MILLIGRAM(S): 220 (50 ZN) TAB at 12:25

## 2022-07-24 RX ADMIN — Medication 25 MILLIGRAM(S): at 05:53

## 2022-07-24 RX ADMIN — HEPARIN SODIUM 1000 UNIT(S)/HR: 5000 INJECTION INTRAVENOUS; SUBCUTANEOUS at 08:34

## 2022-07-24 RX ADMIN — Medication 650 MILLIGRAM(S): at 13:16

## 2022-07-24 RX ADMIN — Medication 650 MILLIGRAM(S): at 13:46

## 2022-07-24 RX ADMIN — HEPARIN SODIUM 1000 UNIT(S)/HR: 5000 INJECTION INTRAVENOUS; SUBCUTANEOUS at 09:26

## 2022-07-24 RX ADMIN — NYSTATIN CREAM 1 APPLICATION(S): 100000 CREAM TOPICAL at 17:35

## 2022-07-24 RX ADMIN — ATORVASTATIN CALCIUM 40 MILLIGRAM(S): 80 TABLET, FILM COATED ORAL at 21:29

## 2022-07-24 RX ADMIN — Medication 1 TABLET(S): at 13:17

## 2022-07-24 RX ADMIN — HEPARIN SODIUM 1000 UNIT(S)/HR: 5000 INJECTION INTRAVENOUS; SUBCUTANEOUS at 01:01

## 2022-07-24 RX ADMIN — Medication 1 TABLET(S): at 05:53

## 2022-07-24 RX ADMIN — Medication 81 MILLIGRAM(S): at 12:25

## 2022-07-24 RX ADMIN — HEPARIN SODIUM 1000 UNIT(S)/HR: 5000 INJECTION INTRAVENOUS; SUBCUTANEOUS at 08:36

## 2022-07-24 RX ADMIN — Medication 1 TABLET(S): at 17:38

## 2022-07-24 RX ADMIN — NYSTATIN CREAM 1 APPLICATION(S): 100000 CREAM TOPICAL at 05:53

## 2022-07-24 RX ADMIN — Medication 500 MILLIGRAM(S): at 12:25

## 2022-07-24 NOTE — PROGRESS NOTE ADULT - ASSESSMENT
81 y/o F with new onset afib, CAD, HTN, OA, spinal stenosis who presents to OSH for syncope transferred to [facility] for EP evaluation

## 2022-07-24 NOTE — PROGRESS NOTE ADULT - PROBLEM SELECTOR PLAN 1
-Worked up at OSH. TTE wnl. EKG consistent with new onset afib.  -Syncope thought to be secondary to afib with RVR vs conversion pause   -appreciate EP recs, pt pending cardiac cath prior to further EP workup. Possible PPM placement  -monitor on telemetry  -[facility] on 7/25/22

## 2022-07-25 LAB
ANION GAP SERPL CALC-SCNC: 12 MMOL/L — SIGNIFICANT CHANGE UP (ref 7–14)
APTT BLD: 46.6 SEC — HIGH (ref 27–36.3)
BUN SERPL-MCNC: 20 MG/DL — SIGNIFICANT CHANGE UP (ref 7–23)
CALCIUM SERPL-MCNC: 9.2 MG/DL — SIGNIFICANT CHANGE UP (ref 8.4–10.5)
CHLORIDE SERPL-SCNC: 106 MMOL/L — SIGNIFICANT CHANGE UP (ref 98–107)
CO2 SERPL-SCNC: 23 MMOL/L — SIGNIFICANT CHANGE UP (ref 22–31)
CREAT SERPL-MCNC: 0.78 MG/DL — SIGNIFICANT CHANGE UP (ref 0.5–1.3)
EGFR: 77 ML/MIN/1.73M2 — SIGNIFICANT CHANGE UP
GLUCOSE SERPL-MCNC: 94 MG/DL — SIGNIFICANT CHANGE UP (ref 70–99)
HCT VFR BLD CALC: 36 % — SIGNIFICANT CHANGE UP (ref 34.5–45)
HGB BLD-MCNC: 11.9 G/DL — SIGNIFICANT CHANGE UP (ref 11.5–15.5)
MAGNESIUM SERPL-MCNC: 1.5 MG/DL — LOW (ref 1.6–2.6)
MCHC RBC-ENTMCNC: 29.6 PG — SIGNIFICANT CHANGE UP (ref 27–34)
MCHC RBC-ENTMCNC: 33.1 GM/DL — SIGNIFICANT CHANGE UP (ref 32–36)
MCV RBC AUTO: 89.6 FL — SIGNIFICANT CHANGE UP (ref 80–100)
NRBC # BLD: 0 /100 WBCS — SIGNIFICANT CHANGE UP
NRBC # FLD: 0 K/UL — SIGNIFICANT CHANGE UP
PHOSPHATE SERPL-MCNC: 3.3 MG/DL — SIGNIFICANT CHANGE UP (ref 2.5–4.5)
PLATELET # BLD AUTO: 172 K/UL — SIGNIFICANT CHANGE UP (ref 150–400)
POTASSIUM SERPL-MCNC: 4 MMOL/L — SIGNIFICANT CHANGE UP (ref 3.5–5.3)
POTASSIUM SERPL-SCNC: 4 MMOL/L — SIGNIFICANT CHANGE UP (ref 3.5–5.3)
RBC # BLD: 4.02 M/UL — SIGNIFICANT CHANGE UP (ref 3.8–5.2)
RBC # FLD: 14.2 % — SIGNIFICANT CHANGE UP (ref 10.3–14.5)
SODIUM SERPL-SCNC: 141 MMOL/L — SIGNIFICANT CHANGE UP (ref 135–145)
WBC # BLD: 4.62 K/UL — SIGNIFICANT CHANGE UP (ref 3.8–10.5)
WBC # FLD AUTO: 4.62 K/UL — SIGNIFICANT CHANGE UP (ref 3.8–10.5)

## 2022-07-25 PROCEDURE — 99232 SBSQ HOSP IP/OBS MODERATE 35: CPT

## 2022-07-25 PROCEDURE — 93454 CORONARY ARTERY ANGIO S&I: CPT | Mod: 26

## 2022-07-25 PROCEDURE — 99233 SBSQ HOSP IP/OBS HIGH 50: CPT

## 2022-07-25 PROCEDURE — 99232 SBSQ HOSP IP/OBS MODERATE 35: CPT | Mod: FS

## 2022-07-25 RX ORDER — HEPARIN SODIUM 5000 [USP'U]/ML
1200 INJECTION INTRAVENOUS; SUBCUTANEOUS
Qty: 25000 | Refills: 0 | Status: DISCONTINUED | OUTPATIENT
Start: 2022-07-26 | End: 2022-07-26

## 2022-07-25 RX ORDER — HEPARIN SODIUM 5000 [USP'U]/ML
9000 INJECTION INTRAVENOUS; SUBCUTANEOUS EVERY 6 HOURS
Refills: 0 | Status: DISCONTINUED | OUTPATIENT
Start: 2022-07-25 | End: 2022-07-26

## 2022-07-25 RX ORDER — HEPARIN SODIUM 5000 [USP'U]/ML
4000 INJECTION INTRAVENOUS; SUBCUTANEOUS EVERY 6 HOURS
Refills: 0 | Status: DISCONTINUED | OUTPATIENT
Start: 2022-07-25 | End: 2022-07-26

## 2022-07-25 RX ORDER — MAGNESIUM SULFATE 500 MG/ML
2 VIAL (ML) INJECTION ONCE
Refills: 0 | Status: COMPLETED | OUTPATIENT
Start: 2022-07-25 | End: 2022-07-25

## 2022-07-25 RX ADMIN — Medication 650 MILLIGRAM(S): at 05:29

## 2022-07-25 RX ADMIN — HEPARIN SODIUM 4000 UNIT(S): 5000 INJECTION INTRAVENOUS; SUBCUTANEOUS at 13:02

## 2022-07-25 RX ADMIN — HEPARIN SODIUM 1200 UNIT(S)/HR: 5000 INJECTION INTRAVENOUS; SUBCUTANEOUS at 13:01

## 2022-07-25 RX ADMIN — HEPARIN SODIUM 1000 UNIT(S)/HR: 5000 INJECTION INTRAVENOUS; SUBCUTANEOUS at 09:27

## 2022-07-25 RX ADMIN — NYSTATIN CREAM 1 APPLICATION(S): 100000 CREAM TOPICAL at 05:26

## 2022-07-25 RX ADMIN — ATORVASTATIN CALCIUM 40 MILLIGRAM(S): 80 TABLET, FILM COATED ORAL at 21:19

## 2022-07-25 RX ADMIN — Medication 25 GRAM(S): at 09:27

## 2022-07-25 RX ADMIN — Medication 650 MILLIGRAM(S): at 06:20

## 2022-07-25 NOTE — PROGRESS NOTE ADULT - ASSESSMENT
83 y/o F with new onset afib, CAD, HTN, OA, spinal stenosis who presents to OSH for syncope transferred to [facility] for EP evaluation

## 2022-07-25 NOTE — PROGRESS NOTE ADULT - ASSESSMENT
81 yo F with hx of CAD, HTN, OA, spinal stenosis who presented to [facility] for syncope and found to have new afib, transferred to [facility] for EP eval.    #Syncope  - EKG in [facility] showed afib with regular QRS (CHB), LAFB, RBBB  - telemetry overnight showed longest pause of <3 sec in afib, now with irregularly irregular rhythm  - TTE in [facility] showed EF 55-60%, dilated LA, normal RV  - NST showed large mod infarct with waqar-infarct ischemia in apical and distal anteroseptal walls, cath was attempted but unable to obtain access  - plan for LHC today  - plan for PPM after C, patient consented   - telemetry  - keep K > 4, Mg > 2    #Afib  - CHADSVASC of 4, will need AC, c/w hep gtt   - c/w metop succinate 25 daily

## 2022-07-25 NOTE — CHART NOTE - NSCHARTNOTEFT_GEN_A_CORE
Patient is s/p cardiac cath via right radial access. Patient without any complaints. Site is stable with no hematoma or active bleed noted. No swelling, dressing is clean/intact/dry. Right Radial pulse palpable.  strength is equal bilaterally. Patient has full ROM in right wrist. Capillary refill < 2 seconds. Will continue to monitor closely.

## 2022-07-25 NOTE — PROGRESS NOTE ADULT - ASSESSMENT
82F hx CAD, HTN, OA, spinal stenosis presented from OSH with syncope and atrial fibrillation with pauses. NST abnormal at OSH, however difficulties with access on LHC; thus prompting transfer to [facility] for LHC and EP evaluation in setting of syncope.     #Syncope  #Atrial fibrillation - CHADSVASC 4  #Abnormal nuclear stress test   #Obesity Class 3    Plan:  LHC today  Resume AC for atrial fibrillation per EP  Resume Metoprolol succinate 25mg QD  Please replete Mg to goal >2

## 2022-07-25 NOTE — PROGRESS NOTE ADULT - ATTENDING COMMENTS
Personally saw and examined patient  labs and vitals reviewed  agree with above assessment and plan  no active cp  plan for [facility] today  appreciated ep recs

## 2022-07-25 NOTE — CHART NOTE - NSCHARTNOTEFT_GEN_A_CORE
s/p cardiac catheterization via RRA access revealing luminal irregulariites; patient with history of afib, may resume heparin drip (no bolus) at midnight if radial site check stable with no bleed and no hematoma s/p cardiac catheterization via Right ulnar artery access revealing luminal irregulariites; patient with history of afib, may resume heparin drip (no bolus) at midnight if radial site check stable with no bleed and no hematoma

## 2022-07-25 NOTE — PROGRESS NOTE ADULT - PROBLEM SELECTOR PLAN 2
--Given syncope and pauses <3 sec in afib  - plan for PPM after LHC  -CHADSVASc score of at least 4. Switched from eliquis to heparin gtt for LHC  -c/w metoprolol for rate control  -- TTE in [facility] showed EF 55-60%, dilated LA, normal RV  -ischemic workup at OSH significant for large sized mod severity infarct with waqar-infarct ischemia apical and distal anteroseptal. Plan for cath today  -Repeat EKG  -Appreciate cardiology and EP recs  -monitor on tele

## 2022-07-25 NOTE — PROGRESS NOTE ADULT - PROBLEM SELECTOR PLAN 1
-Worked up at OSH. TTE wnl. EKG consistent with new onset afib.  -Syncope thought to be secondary to afib with RVR vs conversion pause   -appreciate EP recs, pt pending cardiac cath prior to further EP workup. Possible PPM placement  -monitor on telemetry  -[facility] on 7/25/22

## 2022-07-26 ENCOUNTER — TRANSCRIPTION ENCOUNTER (OUTPATIENT)
Age: 82
End: 2022-07-26

## 2022-07-26 DIAGNOSIS — Z71.89 OTHER SPECIFIED COUNSELING: ICD-10-CM

## 2022-07-26 LAB
ALBUMIN SERPL ELPH-MCNC: 3.2 G/DL — LOW (ref 3.3–5)
ALP SERPL-CCNC: 102 U/L — SIGNIFICANT CHANGE UP (ref 40–120)
ALT FLD-CCNC: 19 U/L — SIGNIFICANT CHANGE UP (ref 4–33)
ANION GAP SERPL CALC-SCNC: 11 MMOL/L — SIGNIFICANT CHANGE UP (ref 7–14)
APTT BLD: 137.9 SEC — CRITICAL HIGH (ref 27–36.3)
AST SERPL-CCNC: 34 U/L — HIGH (ref 4–32)
BILIRUB SERPL-MCNC: 0.5 MG/DL — SIGNIFICANT CHANGE UP (ref 0.2–1.2)
BUN SERPL-MCNC: 18 MG/DL — SIGNIFICANT CHANGE UP (ref 7–23)
CALCIUM SERPL-MCNC: 9.1 MG/DL — SIGNIFICANT CHANGE UP (ref 8.4–10.5)
CHLORIDE SERPL-SCNC: 107 MMOL/L — SIGNIFICANT CHANGE UP (ref 98–107)
CO2 SERPL-SCNC: 21 MMOL/L — LOW (ref 22–31)
CREAT SERPL-MCNC: 0.77 MG/DL — SIGNIFICANT CHANGE UP (ref 0.5–1.3)
EGFR: 78 ML/MIN/1.73M2 — SIGNIFICANT CHANGE UP
GLUCOSE SERPL-MCNC: 93 MG/DL — SIGNIFICANT CHANGE UP (ref 70–99)
HCT VFR BLD CALC: 38.2 % — SIGNIFICANT CHANGE UP (ref 34.5–45)
HGB BLD-MCNC: 12.2 G/DL — SIGNIFICANT CHANGE UP (ref 11.5–15.5)
INR BLD: 1.37 RATIO — HIGH (ref 0.88–1.16)
MAGNESIUM SERPL-MCNC: 1.7 MG/DL — SIGNIFICANT CHANGE UP (ref 1.6–2.6)
MCHC RBC-ENTMCNC: 28.9 PG — SIGNIFICANT CHANGE UP (ref 27–34)
MCHC RBC-ENTMCNC: 31.9 GM/DL — LOW (ref 32–36)
MCV RBC AUTO: 90.5 FL — SIGNIFICANT CHANGE UP (ref 80–100)
NRBC # BLD: 0 /100 WBCS — SIGNIFICANT CHANGE UP
NRBC # FLD: 0 K/UL — SIGNIFICANT CHANGE UP
PHOSPHATE SERPL-MCNC: 3.2 MG/DL — SIGNIFICANT CHANGE UP (ref 2.5–4.5)
PLATELET # BLD AUTO: 190 K/UL — SIGNIFICANT CHANGE UP (ref 150–400)
POTASSIUM SERPL-MCNC: 3.9 MMOL/L — SIGNIFICANT CHANGE UP (ref 3.5–5.3)
POTASSIUM SERPL-SCNC: 3.9 MMOL/L — SIGNIFICANT CHANGE UP (ref 3.5–5.3)
PROT SERPL-MCNC: 6.5 G/DL — SIGNIFICANT CHANGE UP (ref 6–8.3)
PROTHROM AB SERPL-ACNC: 15.9 SEC — HIGH (ref 10.5–13.4)
RBC # BLD: 4.22 M/UL — SIGNIFICANT CHANGE UP (ref 3.8–5.2)
RBC # FLD: 14.4 % — SIGNIFICANT CHANGE UP (ref 10.3–14.5)
SODIUM SERPL-SCNC: 139 MMOL/L — SIGNIFICANT CHANGE UP (ref 135–145)
WBC # BLD: 4.56 K/UL — SIGNIFICANT CHANGE UP (ref 3.8–10.5)
WBC # FLD AUTO: 4.56 K/UL — SIGNIFICANT CHANGE UP (ref 3.8–10.5)

## 2022-07-26 PROCEDURE — 99232 SBSQ HOSP IP/OBS MODERATE 35: CPT

## 2022-07-26 PROCEDURE — 99233 SBSQ HOSP IP/OBS HIGH 50: CPT

## 2022-07-26 PROCEDURE — 93010 ELECTROCARDIOGRAM REPORT: CPT

## 2022-07-26 PROCEDURE — 99232 SBSQ HOSP IP/OBS MODERATE 35: CPT | Mod: FS

## 2022-07-26 PROCEDURE — 93306 TTE W/DOPPLER COMPLETE: CPT | Mod: 26

## 2022-07-26 PROCEDURE — 33208 INSRT HEART PM ATRIAL & VENT: CPT | Mod: KX

## 2022-07-26 RX ORDER — CEFAZOLIN SODIUM 1 G
1000 VIAL (EA) INJECTION EVERY 8 HOURS
Refills: 0 | Status: COMPLETED | OUTPATIENT
Start: 2022-07-26 | End: 2022-07-27

## 2022-07-26 RX ORDER — ACETAMINOPHEN 500 MG
1000 TABLET ORAL ONCE
Refills: 0 | Status: COMPLETED | OUTPATIENT
Start: 2022-07-26 | End: 2022-07-26

## 2022-07-26 RX ADMIN — NYSTATIN CREAM 1 APPLICATION(S): 100000 CREAM TOPICAL at 05:30

## 2022-07-26 RX ADMIN — Medication 500 MILLIGRAM(S): at 17:59

## 2022-07-26 RX ADMIN — ZINC SULFATE TAB 220 MG (50 MG ZINC EQUIVALENT) 220 MILLIGRAM(S): 220 (50 ZN) TAB at 17:59

## 2022-07-26 RX ADMIN — Medication 650 MILLIGRAM(S): at 18:00

## 2022-07-26 RX ADMIN — TRAMADOL HYDROCHLORIDE 50 MILLIGRAM(S): 50 TABLET ORAL at 23:21

## 2022-07-26 RX ADMIN — HEPARIN SODIUM 900 UNIT(S)/HR: 5000 INJECTION INTRAVENOUS; SUBCUTANEOUS at 09:56

## 2022-07-26 RX ADMIN — Medication 1 TABLET(S): at 17:59

## 2022-07-26 RX ADMIN — Medication 100 MILLIGRAM(S): at 22:44

## 2022-07-26 RX ADMIN — Medication 81 MILLIGRAM(S): at 17:59

## 2022-07-26 RX ADMIN — HEPARIN SODIUM 0 UNIT(S)/HR: 5000 INJECTION INTRAVENOUS; SUBCUTANEOUS at 08:33

## 2022-07-26 RX ADMIN — Medication 1000 MILLIGRAM(S): at 15:48

## 2022-07-26 RX ADMIN — Medication 25 MILLIGRAM(S): at 05:28

## 2022-07-26 RX ADMIN — ATORVASTATIN CALCIUM 40 MILLIGRAM(S): 80 TABLET, FILM COATED ORAL at 22:44

## 2022-07-26 RX ADMIN — HEPARIN SODIUM 1200 UNIT(S)/HR: 5000 INJECTION INTRAVENOUS; SUBCUTANEOUS at 00:12

## 2022-07-26 RX ADMIN — LOSARTAN POTASSIUM 25 MILLIGRAM(S): 100 TABLET, FILM COATED ORAL at 05:27

## 2022-07-26 RX ADMIN — Medication 650 MILLIGRAM(S): at 18:57

## 2022-07-26 RX ADMIN — Medication 1 TABLET(S): at 05:29

## 2022-07-26 RX ADMIN — Medication 400 MILLIGRAM(S): at 15:48

## 2022-07-26 RX ADMIN — NYSTATIN CREAM 1 APPLICATION(S): 100000 CREAM TOPICAL at 17:57

## 2022-07-26 NOTE — PROVIDER CONTACT NOTE (CRITICAL VALUE NOTIFICATION) - SITUATION
patient with new onset afib on heparin gtt, aptt >200
Patient has aptt >200.
Patient with new  onset afib on heparin gtt
Patient with new  onset afib on heparin gtt

## 2022-07-26 NOTE — DISCHARGE NOTE PROVIDER - DISCHARGE SERVICE FOR PATIENT
on the discharge service for the patient. I have reviewed and made amendments to the documentation where necessary. No

## 2022-07-26 NOTE — PROGRESS NOTE ADULT - PROBLEM SELECTOR PLAN 1
-Worked up at OSH. TTE wnl. EKG consistent with new onset afib.  -Syncope thought to be secondary to afib with RVR vs conversion pause   - C/w metoprolol 25 mg qd and heparin gtt   -appreciate EP recs, plan for PPM today  -monitor on telemetry  -[facility] (7/25): luminal irregularities -Worked up at OSH. TTE wnl. EKG consistent with new onset afib.  -Syncope thought to be secondary to afib with RVR vs conversion pause   -appreciate EP recs, plan for PPM today  -monitor on telemetry  -[facility] (7/25): luminal irregularities  -dispo: rehab pending PPM placement

## 2022-07-26 NOTE — DISCHARGE NOTE PROVIDER - NSDCCPCAREPLAN_GEN_ALL_CORE_FT
PRINCIPAL DISCHARGE DIAGNOSIS  Diagnosis: Syncope  Assessment and Plan of Treatment: You had an episode of loss of conciousness, and you were found to have a new abnormal heart rhythm called Afib (see below). You had two procedures done. Please notify a provider and return to the ER if you feel lightheaded, have chest pain, shortness of breath or palpitations      SECONDARY DISCHARGE DIAGNOSES  Diagnosis: Hypertension  Assessment and Plan of Treatment: Continue blood pressure medication regimen as directed. Monitor for any visual changes, headaches or dizziness.  Monitor blood pressure regularly.  Follow up with your primary care provider for further management for high blood pressure.    Diagnosis: New onset atrial fibrillation  Assessment and Plan of Treatment: You have a condition called atrial fibrillation, which is an irregular heart rhythm that can increase your risk of stroke.You were seen by a cardiologist and electrophysiologist in the hospital, and you had a catheterization procedure and a pacemaker placed. Please continue your medications as directed and follow-up with your primary provider/cardiologist to further manage your care.   Monitor for signs/symptoms of uncontrolled atrial fibrillation, such as, increased heart rate, palpitations, chest pain, dizziness, or shortness of breath - Return to emergency room if these signs/symptoms are present.   No heavy lifting for one week. No strenuous activity for 1 weeks. Monitor site of procedure and notify your doctor for any redness, swelling, discharge/bleeding. No driving for 24 hours. You may shower but no baths or swimming for one week. If chest pain, shortness of breath, or dizziness please return to the emergency room.     PRINCIPAL DISCHARGE DIAGNOSIS  Diagnosis: Syncope  Assessment and Plan of Treatment: You had an episode of loss of conciousness, and you were found to have a new abnormal heart rhythm called Afib (see below). You had two procedures done. Please notify a provider and return to the ER if you feel lightheaded, have chest pain, shortness of breath or palpitations      SECONDARY DISCHARGE DIAGNOSES  Diagnosis: Hypertension  Assessment and Plan of Treatment: Continue blood pressure medication regimen as directed. Monitor for any visual changes, headaches or dizziness.  Monitor blood pressure regularly.  Follow up with your primary care provider for further management for high blood pressure.    Diagnosis: New onset atrial fibrillation  Assessment and Plan of Treatment: You have a condition called atrial fibrillation, which is an irregular heart rhythm that can increase your risk of stroke.You were seen by a cardiologist and electrophysiologist in the hospital, and you had a catheterization procedure and a pacemaker placed. Please continue your medications as directed and follow-up with your primary provider/cardiologist to further manage your care.   Monitor for signs/symptoms of uncontrolled atrial fibrillation, such as, increased heart rate, palpitations, chest pain, dizziness, or shortness of breath - Return to emergency room if these signs/symptoms are present.   No heavy lifting for one week. No strenuous activity for 1 weeks. Monitor site of procedure and notify your doctor for any redness, swelling, discharge/bleeding. No driving for 24 hours. You may shower but no baths or swimming for one week. If chest pain, shortness of breath, or dizziness please return to the emergency room.  No scrubbing the incision site for 2 weeks  No lotion, ointment, powder or direct sunlight to the incision site for 2 weeks   No lifting more than 5lb or exertional exercising such as jogging, running, bike riding for 6-8 weeks  Do not get the surgical incision wet for 1 week  No swimming pool, Jacuzzi, or bath for 6-8 weeks.   You should carry your ID card for metal detectors   Remove bandage after 24-48hours  Patient can shower 24 hours after procedure. Pat the area dry  Keep Cellular phone 6 in away  from the device  call [phone] if the following occurs:      - fever with temperature > 100.6      - swelling, drainage or bleeding at the site incision       - chest pain, SOB, n/v     PRINCIPAL DISCHARGE DIAGNOSIS  Diagnosis: Syncope  Assessment and Plan of Treatment: You had an episode of loss of conciousness, and you were found to have a new abnormal heart rhythm called Afib (see below). You had two procedures done. You had a pacemaker placed and a cardiac catheterization. Please notify a provider and return to the ER if you feel lightheaded, have chest pain, shortness of breath or palpitations. Please follow-up for your scheduled appointment with Dr. Ray on 8/10/22 at 12PM in the EP Clinic ( 4th floor Oncology building [facility] [address], [place], [place], [address] [phone] )      SECONDARY DISCHARGE DIAGNOSES  Diagnosis: New onset atrial fibrillation  Assessment and Plan of Treatment: You have a condition called atrial fibrillation, which is an irregular heart rhythm that can increase your risk of stroke.You were seen by a cardiologist and electrophysiologist in the hospital, and you had a catheterization procedure and a pacemaker placed. Please continue your medications as directed and follow-up with your primary provider/cardiologist to further manage your care.   Monitor for signs/symptoms of uncontrolled atrial fibrillation, such as, increased heart rate, palpitations, chest pain, dizziness, or shortness of breath - Return to emergency room if these signs/symptoms are present.   No heavy lifting for one week. No strenuous activity for 1 weeks. Monitor site of procedure and notify your doctor for any redness, swelling, discharge/bleeding. You may shower but no baths or swimming for one week. If chest pain, shortness of breath, or dizziness please return to the emergency room.  No scrubbing the incision site for 2 weeks  No lotion, ointment, powder or direct sunlight to the incision site for 2 weeks   No lifting more than 5lb or exertional exercising such as jogging, running, bike riding for 6-8 weeks  Do not get the surgical incision wet for 1 week  No swimming pool, Jacuzzi, or bath for 6-8 weeks.   You should carry your ID card for metal detectors   Remove bandage after 24-48hours  Patient can shower 24 hours after procedure. Pat the area dry  Keep Cellular phone 6 in away  from the device  call [phone] if the following occurs:      - fever with temperature > 100.6      - swelling, drainage or bleeding at the site incision       - chest pain, shortness of breath, nausea, or vomiting    Diagnosis: Hypertension  Assessment and Plan of Treatment: Continue blood pressure medication regimen as directed. Monitor for any visual changes, headaches or dizziness.  Monitor blood pressure regularly.  Follow up with your primary care provider for further management for high blood pressure.

## 2022-07-26 NOTE — PROVIDER CONTACT NOTE (CRITICAL VALUE NOTIFICATION) - ACTION/TREATMENT ORDERED:
provider made aware, heparin gtt held for 1 hr, new rate 10cc/hr, will repeat aPTT, will continue to monitor.
provider made aware, heparin gtt held for 1 hr, new rate 13cc/hr, will repeat aPTT at 1524, will continue to monitor.
ACP Carlitos Velarde notified. Continue to follow Heparin nomogram.
provider made aware

## 2022-07-26 NOTE — CHART NOTE - NSCHARTNOTEFT_GEN_A_CORE
Status post PPM placement. Right chest wall procedure site without bleeding or hematoma.  Dressing is intact.  Right radial pulse present.  Will continue to monitor.

## 2022-07-26 NOTE — PROGRESS NOTE ADULT - ASSESSMENT
81 yo F with hx of CAD, HTN, OA, spinal stenosis who presented to [facility] for syncope and found to have new afib with bifascicular block,  transferred to [facility] for EP eval with PPM.    #Syncope  - EKG in [facility] showed afib with regular QRS (CHB), LAFB, RBBB  - telemetry overnight showed afib  - S/p LHC on 7/25 yesterday with luminal irregularities  - keep K > 4, Mg > 2    #Afib with bifascicular block   - Pt. is consented for PPM given symptomatic bradycardia w/ bifascicular block and syncope. Consent is obtained by Dr. Myers after explained the process of the procedure along with the risks and benefits.  - NPO for anticipated PPM   - CHADSVASC of 4, will need AC, c/w hep gtt   - c/w metop succinate 25 daily

## 2022-07-26 NOTE — PROGRESS NOTE ADULT - ATTENDING COMMENTS
Personally saw and examined patient  labs and vitals reviewed  agree with above assessment and plan  cath completed, no severe lesions  plan for EPS eval for ppm

## 2022-07-26 NOTE — PROVIDER CONTACT NOTE (CRITICAL VALUE NOTIFICATION) - BACKGROUND
HTN, heart disease, spinal stenosis, OSH, syncope
heart disease, diabetes,
heart disease, diabetes,
Patient is 81yo female admitted for heart disease with new onset afib. Patient had syncope and fall prior to admission.

## 2022-07-26 NOTE — DISCHARGE NOTE PROVIDER - DETAILS OF MALNUTRITION DIAGNOSIS/DIAGNOSES
This patient has been assessed with a concern for Malnutrition and was treated during this hospitalization for the following Nutrition diagnosis/diagnoses:     -  07/28/2022: Morbid obesity (BMI > 40)

## 2022-07-26 NOTE — DISCHARGE NOTE PROVIDER - HOSPITAL COURSE
81 y/o F with new onset afib, CAD, HTN, OA, spinal stenosis who presents to OSH for syncope transferred to [facility] for EP evaluation    Syncope.   -Worked up at OSH. TTE wnl. EKG consistent with new onset afib.  -Syncope thought to be secondary to afib with RVR vs conversion pause   -appreciate EP recs, PPM s/p [facility]  -[facility] on 7/25/22.    New onset atrial fibrillation.   --Given syncope and pauses <3 sec in afib  - plan for PPM after [facility]  -CHADSVASc score of at least 4. Switched from eliquis to heparin gtt for [facility]  -c/w metoprolol for rate control  -- TTE in [facility] showed EF 55-60%, dilated LA, normal RV  -ischemic workup at OSH significant for large sized mod severity infarct with waqar-infarct ischemia apical and distal anteroseptal. s/p [facility] 7/25- luminal disease       Spinal stenosis.   -reports chronic lower back pain with associated weakness in the legs, can't lie flat, made worse by recent fall. Denies acute worsening   -proximal weakness likely from deconditioning, distal strength intact.  -c/w tramadol for pain control  -PT recs inpt rehab.    Hypertension.    -c/w losartan and metoprolol.    Patient is medically stable for discharge to rehab as discussed with ____ on ____         83 y/o F with new onset afib, CAD, HTN, OA, spinal stenosis who presents to OSH for syncope transferred to [facility] for EP evaluation    Syncope.   -Worked up at OSH. TTE wnl. EKG consistent with new onset afib.  -Syncope thought to be secondary to afib with RVR vs conversion pause   -appreciate EP recs, PPM s/p LHC  -[facility] on 7/25/22.  - s/p PPM 7/26    New onset atrial fibrillation.   --Given syncope and pauses <3 sec in afib- PPM placed   -CHADSVASc score of at least 4. Switched from eliquis to heparin gtt for [facility]- resume eliquis 7/29  -c/w metoprolol for rate control  -- TTE in [facility] showed EF 55-60%, dilated LA, normal RV  -ischemic workup at OSH significant for large sized mod severity infarct with waqar-infarct ischemia apical and distal anteroseptal. s/p [facility] 7/25- luminal disease     Spinal stenosis.   -reports chronic lower back pain with associated weakness in the legs, can't lie flat, made worse by recent fall. Denies acute worsening   -proximal weakness likely from deconditioning, distal strength intact.  -c/w tramadol for pain control  -PT recs inpt rehab.    Hypertension.    -c/w losartan and metoprolol.    Patient is medically stable for discharge to rehab as discussed with ____ on ____         81 y/o F with new onset afib, CAD, HTN, OA, spinal stenosis who presents to OSH for syncope transferred to [facility] for EP evaluation    Syncope.   -Worked up at OSH. TTE wnl. EKG consistent with new onset afib.  -Syncope thought to be secondary to afib with RVR vs conversion pause   -appreciate EP recs, PPM s/p LHC  -LHC on 7/25/22.  - s/p PPM 7/26    New onset atrial fibrillation.   --Given syncope and pauses <3 sec in afib- PPM placed on 07/26.  -CHADSVASc score of at least 4. Switched from eliquis to heparin gtt for C- resumed eliquis 7/29  -c/w metoprolol for rate control  -TTE in [facility] showed EF 55-60%, dilated LA, normal RV  -ischemic workup at OSH significant for large sized mod severity infarct with waqar-infarct ischemia apical and distal anteroseptal. s/p [facility] 7/25- luminal disease     Spinal stenosis.   -reports chronic lower back pain with associated weakness in the legs, can't lie flat, made worse by recent fall. Denies acute worsening   -proximal weakness likely from deconditioning, distal strength intact.  -c/w tramadol for pain control  -PT recs inpt rehab.    Hypertension.    -c/w losartan and metoprolol.    Patient is medically stable for discharge to rehab as discussed with ____ on ____

## 2022-07-26 NOTE — PROGRESS NOTE ADULT - PROBLEM SELECTOR PLAN 5
DVT ppx: on lovenox DVT ppx: on heparin discussed goals of care with patient, patient would like to be full coded  updated about her medical issues discussed goals of care with patient, patient would like to be full code  updated about her medical issues

## 2022-07-26 NOTE — PROVIDER CONTACT NOTE (CRITICAL VALUE NOTIFICATION) - ASSESSMENT
No acute bleed noted, patient AOx4, denies any chest pain/sob.
Patient is resting comfortably in bed, no discomfort/distress noted. No signs of additional bruising present. No other signs of bleeding present.

## 2022-07-26 NOTE — PROGRESS NOTE ADULT - ASSESSMENT
81 y/o F with new onset afib, CAD, HTN, OA, spinal stenosis who presents to OSH for syncope transferred to [facility] for EP evaluation

## 2022-07-26 NOTE — DISCHARGE NOTE PROVIDER - NSDCMRMEDTOKEN_GEN_ALL_CORE_FT
ascorbic acid 500 mg oral tablet: 1 tab(s) orally once a day  aspirin 81 mg oral tablet: 1 tab(s) orally once a day  atorvastatin 40 mg oral tablet: 1 tab(s) orally once a day  bacitracin 500 units/g topical ointment: Apply topically to affected area 2 times a day  Calcium 500+D oral tablet, chewable: 1 tab(s) orally 2 times a day  losartan 25 mg oral tablet: 1 tab(s) orally once a day  metoprolol succinate 25 mg oral tablet, extended release: 1 tab(s) orally once a day  Vitamin D3 1250 mcg (50,000 intl units) oral capsule: 1 cap(s) orally once a week   apixaban 5 mg oral tablet: 1 tab(s) orally every 12 hours    *Start on 7/29/22  ascorbic acid 500 mg oral tablet: 1 tab(s) orally once a day  aspirin 81 mg oral tablet: 1 tab(s) orally once a day  atorvastatin 40 mg oral tablet: 1 tab(s) orally once a day  atorvastatin 40 mg oral tablet: 1 tab(s) orally once a day (at bedtime)  bacitracin 500 units/g topical ointment: Apply topically to affected area 2 times a day  Calcium 500+D oral tablet, chewable: 1 tab(s) orally 2 times a day  losartan 25 mg oral tablet: 1 tab(s) orally once a day  metoprolol succinate 25 mg oral tablet, extended release: 1 tab(s) orally once a day  Multiple Vitamins oral tablet: 1 tab(s) orally once a day  nystatin 100,000 units/g topical powder: 1 application topically every 12 hours  traMADol 50 mg oral tablet: 1 tab(s) orally every 6 hours, As needed, Severe Pain (7 - 10)  Vitamin D3 1250 mcg (50,000 intl units) oral capsule: 1 cap(s) orally once a week   apixaban 5 mg oral tablet: 1 tab(s) orally every 12 hours      ascorbic acid 500 mg oral tablet: 1 tab(s) orally once a day  aspirin 81 mg oral tablet: 1 tab(s) orally once a day  atorvastatin 40 mg oral tablet: 1 tab(s) orally once a day (at bedtime)  Calcium 500+D oral tablet, chewable: 1 tab(s) orally 2 times a day  losartan 25 mg oral tablet: 1 tab(s) orally once a day  metoprolol succinate 25 mg oral tablet, extended release: 1 tab(s) orally once a day  Multiple Vitamins oral tablet: 1 tab(s) orally once a day  nystatin 100,000 units/g topical powder: 1 application topically every 12 hours  traMADol 50 mg oral tablet: 1 tab(s) orally every 6 hours, As needed, Severe Pain (7 - 10)  Vitamin D3 1250 mcg (50,000 intl units) oral capsule: 1 cap(s) orally once a week

## 2022-07-26 NOTE — PROGRESS NOTE ADULT - ASSESSMENT
82F hx CAD, HTN, OA, spinal stenosis presented from OSH with syncope and atrial fibrillation with pauses. NST abnormal at OSH, however difficulties with access on LHC; thus prompting transfer to [facility] for LHC and EP evaluation in setting of syncope.     #Syncope  #Atrial fibrillation - CHADSVASC 4  #Abnormal nuclear stress test - [facility] no significant CAD   #Obesity Class 3  #HTN    Plan:  [facility] with no significant CAD lesions  Resume AC for atrial fibrillation per EP  Resume Metoprolol succinate 25mg QD, Losartan 25mg QD, Atorvastatin 40mg QD, ASA 81mg QD  Please replete Mg to goal >2  No further diagnostic or therapeutic intervention from general cardiac standpoint, please call back with questions 82F hx CAD, HTN, OA, spinal stenosis presented from OSH with syncope and atrial fibrillation with pauses. NST abnormal at OSH, however difficulties with access on LHC; thus prompting transfer to [facility] for LHC and EP evaluation in setting of syncope.     #Syncope  #Atrial fibrillation - CHADSVASC 4  #Abnormal nuclear stress test - [facility] no significant CAD   #Obesity Class 3  #HTN    Plan:  [facility] with no significant CAD lesions  Resume AC for atrial fibrillation per EP  Resume Metoprolol succinate 25mg QD, Losartan 25mg QD, Atorvastatin 40mg QD, ASA 81mg QD  Please replete Mg to goal >2  No further diagnostic or therapeutic intervention from general cardiac standpoint, please call back with questions  further care per EP

## 2022-07-26 NOTE — PROGRESS NOTE ADULT - PROBLEM SELECTOR PLAN 2
--Given syncope and pauses <3 sec in afib  - plan for PPM after LHC  -CHADSVASc score of at least 4. Switched from eliquis to heparin gtt for LHC  -c/w metoprolol for rate control  -- TTE in [facility] showed EF 55-60%, dilated LA, normal RV  -ischemic workup at OSH significant for large sized mod severity infarct with waqar-infarct ischemia apical and distal anteroseptal. C luminal irregularities   - pLan for PPM today, f/u EP  -monitor on tele --Given syncope and pauses <3 sec in afib  -CHADSVASc score of at least 4. Switched from eliquis to heparin gtt   -c/w metoprolol 25mg for rate control  -- TTE in [facility] showed EF 55-60%, dilated LA, normal RV  -ischemic workup at OSH significant for large sized mod severity infarct with waqar-infarct ischemia apical and distal anteroseptal. [facility] luminal irregularities   - plan for PPM today, f/u EP  -monitor on tele

## 2022-07-26 NOTE — PROVIDER CONTACT NOTE (CRITICAL VALUE NOTIFICATION) - RECOMMENDATIONS
follow heparin nomogram, hold for 1hr, decrease rate from 16cc/hr to 13cc/hr
follow heparin nomogram, hold for 1hr, decrease rate from 13cc/hr to 10cc/hr
ACP Carlitos Velarde notified. Continue to follow Heparin nomogram.
follow heparin nomogram, hold for 1hr, decrease rate from 12cc/hr to 9cc/hr

## 2022-07-26 NOTE — DISCHARGE NOTE PROVIDER - NSDCFUSCHEDAPPT_GEN_ALL_CORE_FT
[facility] Physician [facility] [id number]  Scheduled Appointment: 08/10/2022

## 2022-07-26 NOTE — DISCHARGE NOTE PROVIDER - NSDCFUADDINST_GEN_ALL_CORE_FT
No heavy lifting for one week. No strenuous activity for 1 weeks. Monitor site of procedure and notify your doctor for any redness, swelling, discharge/bleeding. No driving for 24 hours. You may shower but no baths or swimming for one week. If chest pain, shortness of breath, or dizziness please return to the emergency room.  No scrubbing the incision site for 2 weeks No lotion, ointment, powder or direct sunlight to the incision site for 2 weeks  No lifting more than 5lb or exertional exercising such as jogging, running, bike riding for 6-8 weeks Do not get the surgical incision wet for 1 week No swimming pool, Jacuzzi, or bath for 6-8 weeks.  You should carry your ID card for metal detectors  Remove bandage after 24-48hours Patient can shower 24 hours after procedure. Pat the area dry Keep Cellular phone 6 in away  from the device

## 2022-07-26 NOTE — DISCHARGE NOTE PROVIDER - NSDCFUADDAPPT_GEN_ALL_CORE_FT
Please keep your scheduled appointment with the electrophysiologist on 8/10/22 Please keep your scheduled appointment with the electrophysiologist on 8/10/22 schedule  at 12N in the EP Clinic ( 4th floor Oncology [facility] [address], [address], [place], [place], [address] [phone] ) Please keep your scheduled appointment with the electrophysiologist Dr. Ray on 8/10/22 schedule  at 12N in the EP Clinic ( 4th floor Oncology [facility] [address], [address], [place], [place], [address] [phone] )

## 2022-07-27 LAB
ANION GAP SERPL CALC-SCNC: 9 MMOL/L — SIGNIFICANT CHANGE UP (ref 7–14)
BUN SERPL-MCNC: 19 MG/DL — SIGNIFICANT CHANGE UP (ref 7–23)
CALCIUM SERPL-MCNC: 9 MG/DL — SIGNIFICANT CHANGE UP (ref 8.4–10.5)
CHLORIDE SERPL-SCNC: 107 MMOL/L — SIGNIFICANT CHANGE UP (ref 98–107)
CO2 SERPL-SCNC: 22 MMOL/L — SIGNIFICANT CHANGE UP (ref 22–31)
CREAT SERPL-MCNC: 0.84 MG/DL — SIGNIFICANT CHANGE UP (ref 0.5–1.3)
EGFR: 70 ML/MIN/1.73M2 — SIGNIFICANT CHANGE UP
GLUCOSE SERPL-MCNC: 137 MG/DL — HIGH (ref 70–99)
HCT VFR BLD CALC: 38.4 % — SIGNIFICANT CHANGE UP (ref 34.5–45)
HGB BLD-MCNC: 12.4 G/DL — SIGNIFICANT CHANGE UP (ref 11.5–15.5)
MAGNESIUM SERPL-MCNC: 1.6 MG/DL — SIGNIFICANT CHANGE UP (ref 1.6–2.6)
MCHC RBC-ENTMCNC: 29.8 PG — SIGNIFICANT CHANGE UP (ref 27–34)
MCHC RBC-ENTMCNC: 32.3 GM/DL — SIGNIFICANT CHANGE UP (ref 32–36)
MCV RBC AUTO: 92.3 FL — SIGNIFICANT CHANGE UP (ref 80–100)
NRBC # BLD: 0 /100 WBCS — SIGNIFICANT CHANGE UP
NRBC # FLD: 0 K/UL — SIGNIFICANT CHANGE UP
PHOSPHATE SERPL-MCNC: 2.9 MG/DL — SIGNIFICANT CHANGE UP (ref 2.5–4.5)
PLATELET # BLD AUTO: 194 K/UL — SIGNIFICANT CHANGE UP (ref 150–400)
POTASSIUM SERPL-MCNC: 4.7 MMOL/L — SIGNIFICANT CHANGE UP (ref 3.5–5.3)
POTASSIUM SERPL-SCNC: 4.7 MMOL/L — SIGNIFICANT CHANGE UP (ref 3.5–5.3)
RBC # BLD: 4.16 M/UL — SIGNIFICANT CHANGE UP (ref 3.8–5.2)
RBC # FLD: 14.6 % — HIGH (ref 10.3–14.5)
SODIUM SERPL-SCNC: 138 MMOL/L — SIGNIFICANT CHANGE UP (ref 135–145)
WBC # BLD: 4.85 K/UL — SIGNIFICANT CHANGE UP (ref 3.8–10.5)
WBC # FLD AUTO: 4.85 K/UL — SIGNIFICANT CHANGE UP (ref 3.8–10.5)

## 2022-07-27 PROCEDURE — 99232 SBSQ HOSP IP/OBS MODERATE 35: CPT | Mod: FS

## 2022-07-27 PROCEDURE — 71046 X-RAY EXAM CHEST 2 VIEWS: CPT | Mod: 26

## 2022-07-27 PROCEDURE — 99232 SBSQ HOSP IP/OBS MODERATE 35: CPT

## 2022-07-27 RX ORDER — APIXABAN 2.5 MG/1
5 TABLET, FILM COATED ORAL EVERY 12 HOURS
Refills: 0 | Status: DISCONTINUED | OUTPATIENT
Start: 2022-07-29 | End: 2022-07-29

## 2022-07-27 RX ADMIN — LOSARTAN POTASSIUM 25 MILLIGRAM(S): 100 TABLET, FILM COATED ORAL at 06:16

## 2022-07-27 RX ADMIN — Medication 500 MILLIGRAM(S): at 11:35

## 2022-07-27 RX ADMIN — Medication 81 MILLIGRAM(S): at 11:35

## 2022-07-27 RX ADMIN — Medication 650 MILLIGRAM(S): at 22:08

## 2022-07-27 RX ADMIN — ZINC SULFATE TAB 220 MG (50 MG ZINC EQUIVALENT) 220 MILLIGRAM(S): 220 (50 ZN) TAB at 11:34

## 2022-07-27 RX ADMIN — Medication 1 TABLET(S): at 11:35

## 2022-07-27 RX ADMIN — TRAMADOL HYDROCHLORIDE 50 MILLIGRAM(S): 50 TABLET ORAL at 00:20

## 2022-07-27 RX ADMIN — Medication 650 MILLIGRAM(S): at 21:08

## 2022-07-27 RX ADMIN — Medication 1 TABLET(S): at 06:17

## 2022-07-27 RX ADMIN — Medication 25 MILLIGRAM(S): at 06:16

## 2022-07-27 RX ADMIN — Medication 650 MILLIGRAM(S): at 12:00

## 2022-07-27 RX ADMIN — NYSTATIN CREAM 1 APPLICATION(S): 100000 CREAM TOPICAL at 18:16

## 2022-07-27 RX ADMIN — ATORVASTATIN CALCIUM 40 MILLIGRAM(S): 80 TABLET, FILM COATED ORAL at 21:07

## 2022-07-27 RX ADMIN — Medication 100 MILLIGRAM(S): at 06:16

## 2022-07-27 RX ADMIN — Medication 1 TABLET(S): at 18:16

## 2022-07-27 RX ADMIN — NYSTATIN CREAM 1 APPLICATION(S): 100000 CREAM TOPICAL at 06:17

## 2022-07-27 RX ADMIN — Medication 650 MILLIGRAM(S): at 11:35

## 2022-07-27 NOTE — CHART NOTE - NSCHARTNOTEFT_GEN_A_CORE
pt. s/p dural chamber PPM yesterday      L ACW site  was pressure dressed. It was  clean, dry, and intact. No bleeding, drainage hematoma, or ecchymosis appreciated.    Pending CXR       Post-op PPM instruction has been verbally explained and given to the patient. Patient was also given home monitor, booklet and ID card. Patient expressed understanding and all questions were answered. A copy of the instruction is located in the patients chart   Patient is schedule for an appointment on 8/10 at 12N in the EP Clinic ( 4th floor Oncology building [facility] [address], [place], [address] [phone] )    No scrubbing the incision site for 2 weeks  No lotion, ointment, powder or direct sunlight to the incision site for 2 weeks   No lifting more than 5lb or exertional exercising such as jogging, running, bike riding for 6-8 weeks  Do not get the surgical incision wet for 1 week  No swimming pool, Jacuzzi, or bath for 6-8 weeks.   You should carry your ID card for metal detectors   Remove bandage after 24-48hours  Patient can shower 24 hours after procedure. Pat the area dry  Keep Cellular phone 6 in away  from the device  Pt was instructed to call [phone] if the following occurs:      - fever with temperature > 100.6      - swelling, drainage or bleeding at the site incision       - chest pain, SOB, n/v      - if you believe you were shock and then go to the ED pt. s/p dural chamber PPM yesterday      ---L ACW site  was pressure dressed. It was  clean, dry, and intact. No bleeding, drainage hematoma, or ecchymosis appreciated.    ---Pending CXR   ---Please resume DOAC Eliquis on Friday morning 7/29 if site is clean, no hematoma, bleeding.       Post-op PPM instruction has been verbally explained and given to the patient. Patient was also given home monitor, booklet and ID card. Patient expressed understanding and all questions were answered. A copy of the instruction is located in the patients chart   Patient is schedule for an appointment on 8/10 at 12N in the EP Clinic ( 4th floor Oncology building [facility] [address], [place], [place], [address] [phone] )    No scrubbing the incision site for 2 weeks  No lotion, ointment, powder or direct sunlight to the incision site for 2 weeks   No lifting more than 5lb or exertional exercising such as jogging, running, bike riding for 6-8 weeks  Do not get the surgical incision wet for 1 week  No swimming pool, Jacuzzi, or bath for 6-8 weeks.   You should carry your ID card for metal detectors   Remove bandage after 24-48hours  Patient can shower 24 hours after procedure. Pat the area dry  Keep Cellular phone 6 in away  from the device  Pt was instructed to call [phone] if the following occurs:      - fever with temperature > 100.6      - swelling, drainage or bleeding at the site incision       - chest pain, SOB, n/v      - if you believe you were shock and then go to the ED pt. s/p dural chamber PPM yesterday      ---L ACW site  was pressure dressed. It was  clean, dry, and intact. No bleeding, drainage hematoma, or ecchymosis appreciated.    -- CXR with no PTX   ---Please resume DOAC Eliquis on Friday morning 7/29 if site is clean, no hematoma, bleeding.       Post-op PPM instruction has been verbally explained and given to the patient. Patient was also given home monitor, booklet and ID card. Patient expressed understanding and all questions were answered. A copy of the instruction is located in the patients chart   Patient is schedule for an appointment on 8/10 at 12N in the EP Clinic ( 4th floor Oncology building [facility] [address], [address], [place], [place], [address] [phone] )    No scrubbing the incision site for 2 weeks  No lotion, ointment, powder or direct sunlight to the incision site for 2 weeks   No lifting more than 5lb or exertional exercising such as jogging, running, bike riding for 6-8 weeks  Do not get the surgical incision wet for 1 week  No swimming pool, Jacuzzi, or bath for 6-8 weeks.   You should carry your ID card for metal detectors   Remove bandage after 24-48hours  Patient can shower 24 hours after procedure. Pat the area dry  Keep Cellular phone 6 in away  from the device  Pt was instructed to call [phone] if the following occurs:      - fever with temperature > 100.6      - swelling, drainage or bleeding at the site incision       - chest pain, SOB, n/v      - if you believe you were shock and then go to the ED

## 2022-07-27 NOTE — PROGRESS NOTE ADULT - PROBLEM SELECTOR PLAN 2
--Given syncope and pauses <3 sec in afib  -CHADSVASc score of at least 4.  resume eliquis 5 mg bid tomorrow  -c/w metoprolol 25mg for rate control  -- TTE in [facility] showed EF 55-60%, dilated LA, normal RV  -ischemic workup at OSH significant for large sized mod severity infarct with waqar-infarct ischemia apical and distal anteroseptal. [facility] luminal irregularities   -s/p PPM 7/26  -monitor on tele

## 2022-07-27 NOTE — PROGRESS NOTE ADULT - PROBLEM SELECTOR PLAN 5
discussed goals of care with patient, patient would like to be full code  updated about her medical issues

## 2022-07-27 NOTE — PROGRESS NOTE ADULT - ASSESSMENT
81 y/o F with new onset afib, CAD, HTN, OA, spinal stenosis who presents to OSH for syncope transferred to [facility] for EP evaluation

## 2022-07-27 NOTE — PROGRESS NOTE ADULT - NS ATTEND AMEND GEN_ALL_CORE FT
83 yo F with hx of CAD, HTN, OA, spinal stenosis who presented to [facility] for syncope and found to have new afib, transferred to [facility] for EP eval. Plan for C followed by PPM implant.
81 yo F with hx of CAD, HTN, OA, spinal stenosis who presented to [facility] for syncope and found to have new afib with bifascicular block, transferred to [facility] for PPM implant now s/p dual ch PPM. FU as outpt.
81 yo F with hx of CAD, HTN, OA, spinal stenosis who presented to [facility] for syncope and found to have new afib, transferred to [facility] for EP eval. [facility] with luminal irregularities- plan for PPM implant.

## 2022-07-27 NOTE — PROGRESS NOTE ADULT - PROBLEM SELECTOR PLAN 1
-Worked up at OSH. TTE wnl. EKG consistent with new onset afib.  -Syncope thought to be secondary to afib with RVR vs conversion pause   -s/p PPM implantation on 7/26, okay to resume AC tomorrow AM per EP  -monitor on telemetry  -[facility] (7/25): luminal irregularities  -dispo: PT recs rehab, however, pt's been to multiple rehabs in past and wants to go home with home PT, DC plan home with home services in 1-2 days  Patient is schedule for an appointment on 8/10 at 12N in the EP Clinic ( 4th floor Oncology building [facility] [address], [address], [place], [address] [phone] )

## 2022-07-27 NOTE — PROGRESS NOTE ADULT - ASSESSMENT
83 yo F with hx of CAD, HTN, OA, spinal stenosis who presented to [facility] for syncope and found to have new afib with bifascicular block, transferred to [facility] for EP eval with PPM.    #CHB/syncope  - EKG in [facility] showed afib with regular QRS (CHB), LAFB, RBBB  - overnight telemetry showed afib,  on demand  - S/p LHC with luminal irregularities  - s/p PPM placement with Dr. Ray on 7/26, today with sore arm but PPM site is c/d/i  - will confirm with Dr. Ray re timing to re-start AC  - c/w metop succinate 25 daily  - pending CXR  - will set up outpatient follow up with Dr. Ray before discharge 83 yo F with hx of CAD, HTN, OA, spinal stenosis who presented to [facility] for syncope and found to have new afib with bifascicular block, transferred to [facility] for EP eval with PPM.    #CHB/syncope  - EKG in [facility] showed afib with regular QRS (CHB), LAFB, RBBB, overnight telemetry showed afib,  on demand  - S/p LHC with luminal irregularities  - s/p PPM placement with Dr. Ray on 7/26, today with sore arm but PPM site is c/d/i  - can resume AC am on 7/28  - c/w metop succinate 25 daily  - pending CXR  - will set up outpatient follow up with Dr. Ray before discharge

## 2022-07-28 LAB
ANION GAP SERPL CALC-SCNC: 8 MMOL/L — SIGNIFICANT CHANGE UP (ref 7–14)
BUN SERPL-MCNC: 21 MG/DL — SIGNIFICANT CHANGE UP (ref 7–23)
CALCIUM SERPL-MCNC: 9.1 MG/DL — SIGNIFICANT CHANGE UP (ref 8.4–10.5)
CHLORIDE SERPL-SCNC: 108 MMOL/L — HIGH (ref 98–107)
CO2 SERPL-SCNC: 23 MMOL/L — SIGNIFICANT CHANGE UP (ref 22–31)
CREAT SERPL-MCNC: 0.94 MG/DL — SIGNIFICANT CHANGE UP (ref 0.5–1.3)
EGFR: 61 ML/MIN/1.73M2 — SIGNIFICANT CHANGE UP
GLUCOSE SERPL-MCNC: 115 MG/DL — HIGH (ref 70–99)
HCT VFR BLD CALC: 36.3 % — SIGNIFICANT CHANGE UP (ref 34.5–45)
HGB BLD-MCNC: 11.6 G/DL — SIGNIFICANT CHANGE UP (ref 11.5–15.5)
MAGNESIUM SERPL-MCNC: 1.6 MG/DL — SIGNIFICANT CHANGE UP (ref 1.6–2.6)
MCHC RBC-ENTMCNC: 28.9 PG — SIGNIFICANT CHANGE UP (ref 27–34)
MCHC RBC-ENTMCNC: 32 GM/DL — SIGNIFICANT CHANGE UP (ref 32–36)
MCV RBC AUTO: 90.3 FL — SIGNIFICANT CHANGE UP (ref 80–100)
NRBC # BLD: 0 /100 WBCS — SIGNIFICANT CHANGE UP
NRBC # FLD: 0 K/UL — SIGNIFICANT CHANGE UP
PHOSPHATE SERPL-MCNC: 2.7 MG/DL — SIGNIFICANT CHANGE UP (ref 2.5–4.5)
PLATELET # BLD AUTO: 159 K/UL — SIGNIFICANT CHANGE UP (ref 150–400)
POTASSIUM SERPL-MCNC: 5 MMOL/L — SIGNIFICANT CHANGE UP (ref 3.5–5.3)
POTASSIUM SERPL-SCNC: 5 MMOL/L — SIGNIFICANT CHANGE UP (ref 3.5–5.3)
RBC # BLD: 4.02 M/UL — SIGNIFICANT CHANGE UP (ref 3.8–5.2)
RBC # FLD: 14.6 % — HIGH (ref 10.3–14.5)
SARS-COV-2 RNA SPEC QL NAA+PROBE: SIGNIFICANT CHANGE UP
SODIUM SERPL-SCNC: 139 MMOL/L — SIGNIFICANT CHANGE UP (ref 135–145)
WBC # BLD: 5.1 K/UL — SIGNIFICANT CHANGE UP (ref 3.8–10.5)
WBC # FLD AUTO: 5.1 K/UL — SIGNIFICANT CHANGE UP (ref 3.8–10.5)

## 2022-07-28 PROCEDURE — 99239 HOSP IP/OBS DSCHRG MGMT >30: CPT

## 2022-07-28 PROCEDURE — 99232 SBSQ HOSP IP/OBS MODERATE 35: CPT

## 2022-07-28 RX ORDER — APIXABAN 2.5 MG/1
1 TABLET, FILM COATED ORAL
Qty: 0 | Refills: 0 | DISCHARGE
Start: 2022-07-28

## 2022-07-28 RX ORDER — NYSTATIN CREAM 100000 [USP'U]/G
1 CREAM TOPICAL
Qty: 0 | Refills: 0 | DISCHARGE
Start: 2022-07-28

## 2022-07-28 RX ORDER — ATORVASTATIN CALCIUM 80 MG/1
1 TABLET, FILM COATED ORAL
Qty: 0 | Refills: 0 | DISCHARGE
Start: 2022-07-28

## 2022-07-28 RX ORDER — TRAMADOL HYDROCHLORIDE 50 MG/1
1 TABLET ORAL
Qty: 0 | Refills: 0 | DISCHARGE
Start: 2022-07-28

## 2022-07-28 RX ORDER — TRAMADOL HYDROCHLORIDE 50 MG/1
50 TABLET ORAL EVERY 6 HOURS
Refills: 0 | Status: DISCONTINUED | OUTPATIENT
Start: 2022-07-28 | End: 2022-07-29

## 2022-07-28 RX ADMIN — Medication 1 TABLET(S): at 12:09

## 2022-07-28 RX ADMIN — NYSTATIN CREAM 1 APPLICATION(S): 100000 CREAM TOPICAL at 05:21

## 2022-07-28 RX ADMIN — Medication 1 TABLET(S): at 05:20

## 2022-07-28 RX ADMIN — Medication 81 MILLIGRAM(S): at 12:10

## 2022-07-28 RX ADMIN — Medication 1 TABLET(S): at 18:32

## 2022-07-28 RX ADMIN — Medication 500 MILLIGRAM(S): at 12:10

## 2022-07-28 RX ADMIN — NYSTATIN CREAM 1 APPLICATION(S): 100000 CREAM TOPICAL at 18:32

## 2022-07-28 RX ADMIN — LOSARTAN POTASSIUM 25 MILLIGRAM(S): 100 TABLET, FILM COATED ORAL at 05:21

## 2022-07-28 RX ADMIN — Medication 650 MILLIGRAM(S): at 06:21

## 2022-07-28 RX ADMIN — ZINC SULFATE TAB 220 MG (50 MG ZINC EQUIVALENT) 220 MILLIGRAM(S): 220 (50 ZN) TAB at 12:10

## 2022-07-28 RX ADMIN — ATORVASTATIN CALCIUM 40 MILLIGRAM(S): 80 TABLET, FILM COATED ORAL at 21:46

## 2022-07-28 RX ADMIN — Medication 650 MILLIGRAM(S): at 19:02

## 2022-07-28 RX ADMIN — Medication 650 MILLIGRAM(S): at 05:21

## 2022-07-28 RX ADMIN — Medication 650 MILLIGRAM(S): at 18:32

## 2022-07-28 RX ADMIN — Medication 25 MILLIGRAM(S): at 05:21

## 2022-07-28 NOTE — DIETITIAN INITIAL EVALUATION ADULT - PERTINENT MEDS FT
MEDICATIONS  (STANDING):  ascorbic acid 500 milliGRAM(s) Oral daily  aspirin enteric coated 81 milliGRAM(s) Oral daily  atorvastatin 40 milliGRAM(s) Oral at bedtime  calcium carbonate 1250 mG  + Vitamin D (OsCal 500 + D) 1 Tablet(s) Oral two times a day  losartan 25 milliGRAM(s) Oral daily  metoprolol succinate ER 25 milliGRAM(s) Oral daily  multivitamin 1 Tablet(s) Oral daily  nystatin Powder 1 Application(s) Topical every 12 hours  zinc sulfate 220 milliGRAM(s) Oral daily    MEDICATIONS  (PRN):  acetaminophen     Tablet .. 650 milliGRAM(s) Oral every 6 hours PRN Mild Pain (1 - 3), Moderate Pain (4 - 6)  traMADol 50 milliGRAM(s) Oral every 6 hours PRN Severe Pain (7 - 10)

## 2022-07-28 NOTE — PROGRESS NOTE ADULT - ASSESSMENT
81 y/o F with new onset afib, CAD, HTN, OA, spinal stenosis who presents to OSH for syncope transferred to [facility] for EP evaluation

## 2022-07-28 NOTE — DIETITIAN INITIAL EVALUATION ADULT - OTHER INFO
Per chart, 83 y/o F with new onset afib, CAD, HTN, OA, spinal stenosis who presents to OSH for syncope transferred to [facility] for EP evaluation. s/p dural chamber PPM.    Patient reports appetite has been good and tolerated diet well, denies chewing/swallowing difficulties at meals, denies GI distress (nausea/vomiting/diarrhea/constipation) at this time, last BM yesterday per pt. Noted s/p SLP swallow eval 7/23 which recommended Regular/thin liquid diet. Patient reports she had PO intake 75% at meals today.  NKFA per patient. Dislikes coffee-- honored. Patient endorses she tries to follow DM recommendations at home, HgbA1C 6.4% which indicates good long-term glycemic control. RDN reviewed DASH/DM diet recommendations with patient who verbalized understanding.

## 2022-07-28 NOTE — PROGRESS NOTE ADULT - PROBLEM SELECTOR PLAN 1
-Worked up at OSH. TTE wnl. EKG consistent with new onset afib.  -Syncope thought to be secondary to afib with RVR vs conversion pause   -s/p PPM implantation on 7/26, okay to resume AC tomorrow AM per EP  -monitor on telemetry  -[facility] (7/25): luminal irregularities  -Dispo: Patient is medically optimized for discharge. She needs to go to rehab per PT recs, high fall risk and can't ambulate safely, however, she is very reluctant about rehab and wants to go home with home services/PT, NOT a safe option in my view, f/u SW  -Outpt f/u EP on discharge: Patient is schedule for an appointment on 8/10 at 12N in the EP Clinic ( 4th floor Oncology building [facility] [address], [place], [address] [phone] ) -Worked up at OSH. TTE wnl. EKG consistent with new onset afib.  -Syncope thought to be secondary to afib with RVR vs conversion pause   -s/p PPM implantation on 7/26, okay to resume AC tomorrow AM per EP  -monitor on telemetry, Afib and V-paced on tele  -[facility] (7/25): luminal irregularities  -Dispo: Patient is medically optimized for discharge. She needs to go to rehab per PT recs, as she's deconditioned with high fall risk and can't ambulate safely, however, she is very reluctant about rehab and wants to go home with home services/PT, NOT a safe option in my view, f/u SW  -Outpt f/u EP on discharge: Patient is schedule for an appointment on 8/10 at 12N in the EP Clinic ( 4th floor Oncology building [facility] [address], [place], [address] [phone] ) -Worked up at OSH. TTE wnl. EKG consistent with new onset afib.  -Syncope thought to be secondary to afib with RVR vs conversion pause   -s/p PPM implantation on 7/26, okay to resume AC tomorrow AM per EP  -monitor on telemetry, Afib and V-paced on tele  -[facility] (7/25): luminal irregularities  -Dispo: Patient is medically optimized for discharge. She needs to go to rehab per PT recs, as she's deconditioned with high fall risk and can't ambulate safely, however, she is very reluctant about rehab and wants to go home with home services/PT, NOT a safe option in my view, f/u SW  -Outpt f/u EP on discharge: Patient is schedule for an appointment on 8/10 at 12N in the EP Clinic ( 4th floor Oncology building [facility] [address], [address], [place], [address] [phone] )  Updated pt on 7/28 who's agreeable with rehab -Worked up at OSH. TTE wnl. EKG consistent with new onset afib.  -Syncope thought to be secondary to afib with RVR vs conversion pause   -s/p PPM implantation on 7/26, okay to resume AC tomorrow AM per EP  -monitor on telemetry, Afib and V-paced on tele  -[facility] (7/25): luminal irregularities  -Dispo: Patient is medically optimized for discharge. She needs to go to rehab per PT recs, as she's deconditioned with high fall risk and can't ambulate safely, however, she is very reluctant about rehab and wants to go home with home services/PT, NOT a safe option in my view, f/u SW  -Outpt f/u EP on discharge: Patient is schedule for an appointment on 8/10 at 12N in the EP Clinic ( 4th floor Oncology building [facility] [address], [place], [address] [phone] )  Updated pt's daughter on 7/28 who's agreeable with rehab -Worked up at OSH. TTE wnl. EKG consistent with new onset afib.  -Syncope thought to be secondary to afib with RVR vs conversion pause   -s/p PPM implantation on 7/26, okay to resume AC tomorrow AM per EP  -monitor on telemetry, Afib and V-paced on tele  -C (7/25): luminal irregularities  -Dispo: Patient is medically optimized for discharge, has been accepted to rehab at [facility]. She needs to go to rehab per PT recs, as she's deconditioned with high fall risk and can't ambulate safely, however, she is very reluctant about rehab and wants to go home with home services/PT, NOT a safe option in my view, f/u SW  -Outpt f/u EP on discharge: Patient is schedule for an appointment on 8/10 at 12N in the EP Clinic ( 4th floor Oncology building [facility] [address], [place], [address] [phone] )  Updated pt's daughter on 7/28 who's agreeable with rehab  Time spent on discharge 32 minutes coordinating discharge plan and discussing with patient and family.

## 2022-07-28 NOTE — CHART NOTE - NSCHARTNOTEFT_GEN_A_CORE
Patient doing well this morning. Telemetry check showed afib and V-paced on demand. Shoulder pain improving. Outpatient appt with Dr. Ray scheduled.

## 2022-07-28 NOTE — DIETITIAN INITIAL EVALUATION ADULT - PERTINENT LABORATORY DATA
07-28    139  |  108<H>  |  21  ----------------------------<  115<H>  5.0   |  23  |  0.94    Ca    9.1      28 Jul 2022 04:44  Phos  2.7     07-28  Mg     1.60     07-28    A1C with Estimated Average Glucose Result: 6.4 % (07-22-22 @ 01:12)

## 2022-07-28 NOTE — DIETITIAN INITIAL EVALUATION ADULT - ADD RECOMMEND
1. c/w Multivitamin and OsCal per MD order. 2. Monitor weights, labs, BM's, skin integrity, PO intake/tolerance. 3. Reinforce diet education with pt PRN. 4. Recommend patient to follow with outpatient RDN for long term weight management.

## 2022-07-28 NOTE — PROGRESS NOTE ADULT - PROBLEM SELECTOR PLAN 2
--Given syncope and pauses <3 sec in afib  -CHADSVASc score of at least 4.  resume eliquis 5 mg bid tomorrow 7/29  -c/w metoprolol 25mg for rate control  -- TTE in [facility] showed EF 55-60%, dilated LA, normal RV  -ischemic workup at OSH significant for large sized mod severity infarct with waqar-infarct ischemia apical and distal anteroseptal. [facility] luminal irregularities   -s/p PPM 7/26  -monitor on tele

## 2022-07-28 NOTE — DIETITIAN INITIAL EVALUATION ADULT - CALCULATED TO (CAL/KG)
"     SUBJECTIVE:   CC: Joselyn James is an 62 year old woman who presents for preventive health visit.       Patient has been advised of split billing requirements and indicates understanding: Yes  Healthy Habits:     Getting at least 3 servings of Calcium per day:  Yes    Bi-annual eye exam:  Yes    Dental care twice a year:  Yes    Sleep apnea or symptoms of sleep apnea:  None    Diet:  Other    Frequency of exercise:  2-3 days/week    Duration of exercise:  30-45 minutes    Taking medications regularly:  Yes    PHQ-2 Total Score: 0    Additional concerns today:  No      Here to establish care - previously with [facility] but provider moved clinics and this clinic is closer for her  Does not come in often - states she feels she is very healthy. Follows a very natural diet and avoids medications when able.   Eats all organic  Sees a chiropractor weekly who also practices natural medicine  Major health issue is her Crohns which she feels has been very stable for 20+ years with no changes to her medication  Once and awhile she will feel like her body \"flares\" but she will increase her vitamin C and things usually settle back down  She does have a GI doctor she follows with but he stated she could do regular visit with her PCP and follow with him when she is due for her scans  Besides her Crohn's they are monitoring a cyst on her pancreas. Last image actually showed it had decreased in size  Last colonoscopy was normal     Blood pressure a little high today   She admits she has cut her metoprolol in 1/2 and usually just does the 1/2 dose but will increase to a full tablet if she needs to  Does have the ability to check her blood pressure at home    Not vaccinated. Doesn't feel she needs it  Has a stepson in ICU at [facility] right now with COVID - she is hoping he is on the mend (was intubated up until 3 days ago)  Normally goes to [place] in the winter for a couple months but they are staying her until they know their " osmany is stable/improving    Today's PHQ-2 Score:   PHQ-2 (  [employer]) 2022   Q1: Little interest or pleasure in doing things 0   Q2: Feeling down, depressed or hopeless 0   PHQ-2 Score 0   Q1: Little interest or pleasure in doing things Not at all   Q2: Feeling down, depressed or hopeless Not at all   PHQ-2 Score 0       Abuse: Current or Past (Physical, Sexual or Emotional) - No  Do you feel safe in your environment? Yes        Social History     Tobacco Use     Smoking status: Former Smoker     Quit date: 2001     Years since quittin.9     Smokeless tobacco: Never Used     Tobacco comment: Quit 20 yrs ago.   Substance Use Topics     Alcohol use: Yes     Comment: Alcoholic Drinks/day: occasional         Alcohol Use 2022   Prescreen: >3 drinks/day or >7 drinks/week? No       Reviewed orders with patient.  Reviewed health maintenance and updated orders accordingly - Yes  BP Readings from Last 3 Encounters:   22 (!) 144/86   19 (!) 170/92   19 (!) 148/82    Wt Readings from Last 3 Encounters:   22 67.6 kg (149 lb)   19 65.8 kg (145 lb 1.6 oz)   19 64.4 kg (142 lb)                    Breast Cancer Screening:    Breast CA Risk Assessment (FHS-7) 2022   Do you have a family history of breast, colon, or ovarian cancer? No / Unknown           Pertinent mammograms are reviewed under the imaging tab.    History of abnormal Pap smear: PAP no longer indicated     Reviewed and updated as needed this visit by clinical staff  Tobacco  Allergies  Meds   Med Hx  Surg Hx  Fam Hx  Soc Hx       Reviewed and updated as needed this visit by Provider                   Review of Systems  CONSTITUTIONAL: NEGATIVE for fever, chills, change in weight  INTEGUMENTARY/SKIN: NEGATIVE for worrisome rashes, moles or lesions  EYES: NEGATIVE for vision changes or irritation  ENT: NEGATIVE for ear, mouth and throat problems  RESP: NEGATIVE for significant cough or SOB  BREAST:  "NEGATIVE for masses, tenderness or discharge  CV: NEGATIVE for chest pain, palpitations or peripheral edema  GI: NEGATIVE for nausea, abdominal pain, heartburn, or change in bowel habits  : NEGATIVE for unusual urinary or vaginal symptoms. No vaginal bleeding.  MUSCULOSKELETAL: NEGATIVE for significant arthralgias or myalgia  NEURO: NEGATIVE for weakness, dizziness or paresthesias  PSYCHIATRIC: NEGATIVE for changes in mood or affect      OBJECTIVE:   BP (!) 144/86   Pulse 118   Temp 98.7  F (37.1  C) (Tympanic)   Ht 1.638 m (5' 4.5\")   Wt 67.6 kg (149 lb)   SpO2 98%   BMI 25.18 kg/m    Physical Exam  GENERAL APPEARANCE: healthy, alert and no distress  EYES: Eyes grossly normal to inspection, PERRL and conjunctivae and sclerae normal  HENT: ear canals and TM's normal, nose and mouth without ulcers or lesions, oropharynx clear and oral mucous membranes moist  NECK: no adenopathy, no asymmetry, masses, or scars and thyroid normal to palpation  RESP: lungs clear to auscultation - no rales, rhonchi or wheezes  CV: regular rate and rhythm, normal S1 S2, no S3 or S4, no murmur, click or rub, no peripheral edema and peripheral pulses strong  ABDOMEN: soft, nontender, no hepatosplenomegaly, no masses and bowel sounds normal  MS: no musculoskeletal defects are noted and gait is age appropriate without ataxia  SKIN: no suspicious lesions or rashes  NEURO: Normal strength and tone, sensory exam grossly normal, mentation intact and speech normal  PSYCH: mentation appears normal and affect normal/bright    Diagnostic Test Results:  pending    ASSESSMENT/PLAN:   (Z00.00) Routine general medical examination at a health care facility  (primary encounter diagnosis)  Comment:   Plan: omeprazole (PRILOSEC) 20 MG DR capsule,         metoprolol succinate ER (TOPROL-XL) 25 MG 24 hr        tablet, Lipid panel reflex to direct LDL         Fasting, CBC with platelets and differential,         Comprehensive metabolic panel (BMP + Alb, " "Alk         Phos, ALT, AST, Total. Bili, TP)            (K50.919) Crohn's disease with complication, unspecified gastrointestinal tract location (H)  Comment: follows with GI on an infrequent but regular basis for imaging. Crohn's stable with no changes to medications recently  Plan: sulfaSALAzine (AZULFIDINE) 500 MG tablet,         omeprazole (PRILOSEC) 20 MG DR capsule            (Z12.31) Encounter for screening mammogram for breast cancer  Comment:   Plan: *MA Screening Digital Bilateral            (I10) Hypertension, unspecified type  Comment: advised she continue to check at home. Goal is to have majority (>80%) of readings below 140/90. Increase medication up/down to keep numbers within goal  Plan: metoprolol succinate ER (TOPROL-XL) 25 MG 24 hr        tablet, Comprehensive metabolic panel (BMP +         Alb, Alk Phos, ALT, AST, Total. Bili, TP)                  Patient has been advised of split billing requirements and indicates understanding: Yes  COUNSELING:  Reviewed preventive health counseling, as reflected in patient instructions    Estimated body mass index is 25.18 kg/m  as calculated from the following:    Height as of this encounter: 1.638 m (5' 4.5\").    Weight as of this encounter: 67.6 kg (149 lb).      She reports that she quit smoking about 20 years ago. She has never used smokeless tobacco.      Counseling Resources:  ATP IV Guidelines  Pooled Cohorts Equation Calculator  Breast Cancer Risk Calculator  BRCA-Related Cancer Risk Assessment: FHS-7 Tool  FRAX Risk Assessment  ICSI Preventive Guidelines  Dietary Guidelines for Americans, 2010  USDA's MyPlate  ASA Prophylaxis  Lung CA Screening    Anju Hamlin PA-C  [facility]  " [phone]

## 2022-07-29 ENCOUNTER — TRANSCRIPTION ENCOUNTER (OUTPATIENT)
Age: 82
End: 2022-07-29

## 2022-07-29 VITALS
OXYGEN SATURATION: 97 % | DIASTOLIC BLOOD PRESSURE: 62 MMHG | TEMPERATURE: 98 F | HEART RATE: 71 BPM | RESPIRATION RATE: 18 BRPM | SYSTOLIC BLOOD PRESSURE: 126 MMHG

## 2022-07-29 LAB
ANION GAP SERPL CALC-SCNC: 6 MMOL/L — LOW (ref 7–14)
BUN SERPL-MCNC: 23 MG/DL — SIGNIFICANT CHANGE UP (ref 7–23)
CALCIUM SERPL-MCNC: 8.8 MG/DL — SIGNIFICANT CHANGE UP (ref 8.4–10.5)
CHLORIDE SERPL-SCNC: 107 MMOL/L — SIGNIFICANT CHANGE UP (ref 98–107)
CO2 SERPL-SCNC: 24 MMOL/L — SIGNIFICANT CHANGE UP (ref 22–31)
CREAT SERPL-MCNC: 0.96 MG/DL — SIGNIFICANT CHANGE UP (ref 0.5–1.3)
EGFR: 60 ML/MIN/1.73M2 — SIGNIFICANT CHANGE UP
GLUCOSE SERPL-MCNC: 88 MG/DL — SIGNIFICANT CHANGE UP (ref 70–99)
HCT VFR BLD CALC: 35.5 % — SIGNIFICANT CHANGE UP (ref 34.5–45)
HGB BLD-MCNC: 11.4 G/DL — LOW (ref 11.5–15.5)
MAGNESIUM SERPL-MCNC: 1.6 MG/DL — SIGNIFICANT CHANGE UP (ref 1.6–2.6)
MCHC RBC-ENTMCNC: 29.3 PG — SIGNIFICANT CHANGE UP (ref 27–34)
MCHC RBC-ENTMCNC: 32.1 GM/DL — SIGNIFICANT CHANGE UP (ref 32–36)
MCV RBC AUTO: 91.3 FL — SIGNIFICANT CHANGE UP (ref 80–100)
NRBC # BLD: 0 /100 WBCS — SIGNIFICANT CHANGE UP
NRBC # FLD: 0 K/UL — SIGNIFICANT CHANGE UP
PHOSPHATE SERPL-MCNC: 3.3 MG/DL — SIGNIFICANT CHANGE UP (ref 2.5–4.5)
PLATELET # BLD AUTO: 160 K/UL — SIGNIFICANT CHANGE UP (ref 150–400)
POTASSIUM SERPL-MCNC: 5 MMOL/L — SIGNIFICANT CHANGE UP (ref 3.5–5.3)
POTASSIUM SERPL-SCNC: 5 MMOL/L — SIGNIFICANT CHANGE UP (ref 3.5–5.3)
RBC # BLD: 3.89 M/UL — SIGNIFICANT CHANGE UP (ref 3.8–5.2)
RBC # FLD: 14.7 % — HIGH (ref 10.3–14.5)
SODIUM SERPL-SCNC: 137 MMOL/L — SIGNIFICANT CHANGE UP (ref 135–145)
WBC # BLD: 6.42 K/UL — SIGNIFICANT CHANGE UP (ref 3.8–10.5)
WBC # FLD AUTO: 6.42 K/UL — SIGNIFICANT CHANGE UP (ref 3.8–10.5)

## 2022-07-29 PROCEDURE — 99497 ADVNCD CARE PLAN 30 MIN: CPT

## 2022-07-29 PROCEDURE — 99239 HOSP IP/OBS DSCHRG MGMT >30: CPT

## 2022-07-29 RX ORDER — KETOROLAC TROMETHAMINE 30 MG/ML
15 SYRINGE (ML) INJECTION ONCE
Refills: 0 | Status: DISCONTINUED | OUTPATIENT
Start: 2022-07-29 | End: 2022-07-29

## 2022-07-29 RX ORDER — ATORVASTATIN CALCIUM 80 MG/1
1 TABLET, FILM COATED ORAL
Qty: 0 | Refills: 0 | DISCHARGE

## 2022-07-29 RX ADMIN — Medication 1 TABLET(S): at 07:01

## 2022-07-29 RX ADMIN — Medication 81 MILLIGRAM(S): at 11:31

## 2022-07-29 RX ADMIN — Medication 650 MILLIGRAM(S): at 05:42

## 2022-07-29 RX ADMIN — Medication 650 MILLIGRAM(S): at 06:50

## 2022-07-29 RX ADMIN — LOSARTAN POTASSIUM 25 MILLIGRAM(S): 100 TABLET, FILM COATED ORAL at 07:00

## 2022-07-29 RX ADMIN — Medication 500 MILLIGRAM(S): at 11:31

## 2022-07-29 RX ADMIN — Medication 15 MILLIGRAM(S): at 11:19

## 2022-07-29 RX ADMIN — APIXABAN 5 MILLIGRAM(S): 2.5 TABLET, FILM COATED ORAL at 07:01

## 2022-07-29 RX ADMIN — ZINC SULFATE TAB 220 MG (50 MG ZINC EQUIVALENT) 220 MILLIGRAM(S): 220 (50 ZN) TAB at 11:30

## 2022-07-29 RX ADMIN — Medication 1 TABLET(S): at 11:31

## 2022-07-29 RX ADMIN — Medication 25 MILLIGRAM(S): at 07:00

## 2022-07-29 RX ADMIN — Medication 15 MILLIGRAM(S): at 10:49

## 2022-07-29 RX ADMIN — NYSTATIN CREAM 1 APPLICATION(S): 100000 CREAM TOPICAL at 07:01

## 2022-07-29 NOTE — PROGRESS NOTE ADULT - PROBLEM SELECTOR PLAN 1
-Worked up at OSH. TTE wnl. EKG consistent with new onset afib.  -Syncope thought to be secondary to afib with RVR vs conversion pause   -s/p PPM implantation on 7/26, okay to resume AC tomorrow AM per EP  -monitor on telemetry, Afib and V-paced on tele  -[facility] (7/25): luminal irregularities  -Dispo: Now agreeable to rehab, discharge to rehab when bed available, accepted by May but she wants a rehab facility closer to her daughter, f/u SW  -Outpt f/u EP on discharge: Patient is schedule for an appointment on 8/10 at 12N in the EP Clinic ( 4th floor Oncology building [facility] [id number], [place], [place], [address] [phone] )  Updated pt's daughter on 7/28 who's agreeable with rehab

## 2022-07-29 NOTE — DISCHARGE NOTE NURSING/CASE MANAGEMENT/SOCIAL WORK - PATIENT PORTAL LINK FT
You can access the FollowMyHealth Patient Portal offered by [facility] by registering at the following website: [email or web address]. By joining [employer]’s FollowMyHealth portal, you will also be able to view your health information using other applications (apps) compatible with our system.

## 2022-07-29 NOTE — PROGRESS NOTE ADULT - PROBLEM SELECTOR PLAN 3
-reports chronic lower back pain with associated weakness in the legs, can't lie flat, made worse by recent fall. Denies acute worsening   -proximal weakness likely from deconditioning, distal strength intact.  -c/w tramadol for pain control  -PT recs rehab, PT f/u, pt wants to go home with home PT
-reports chronic lower back pain with associated weakness in the legs, can't lie flat, made worse by recent fall. Denies acute worsening   -proximal weakness likely from deconditioning, distal strength intact.  -c/w tramadol for pain control  -PT recs inpt rehab
-reports chronic lower back pain with associated weakness in the legs, can't lie flat, made worse by recent fall. Denies acute worsening   -proximal weakness likely from deconditioning, distal strength intact.  -c/w tramadol for pain control  -PT recs inpt rehab
-reports chronic lower back pain with associated weakness in the legs, can't lie flat, made worse by recent fall. Denies acute worsening   -proximal weakness likely from deconditioning, distal strength intact.  -c/w tramadol for pain control  -PT recs rehab, PT f/u, pt wants to go home with home PT
-reports chronic lower back pain with associated weakness in the legs, made worse by recent fall. Denies acute worsening   -proximal weakness likely from deconditioning, distal strength intact.  -c/w tramadol for pain control  -PT evaluation
-reports chronic lower back pain with associated weakness in the legs, can't lie flat, made worse by recent fall. Denies acute worsening   -proximal weakness likely from deconditioning, distal strength intact.  -c/w tramadol for pain control  -PT recs rehab, PT f/u, pt wants to go home with home PT

## 2022-07-29 NOTE — DISCHARGE NOTE NURSING/CASE MANAGEMENT/SOCIAL WORK - NSDCFUADDAPPT_GEN_ALL_CORE_FT
Please keep your scheduled appointment with the electrophysiologist Dr. Ray on 8/10/22 schedule  at 12N in the EP Clinic ( 4th floor Oncology [facility] [id number], [address], [place], [place], [address] [phone] )

## 2022-07-29 NOTE — PROGRESS NOTE ADULT - PROBLEM SELECTOR PLAN 5
discussed goals of care with patient, patient would like to be full code, looking forward to spend more time with family and grandchildren.

## 2022-07-29 NOTE — GOALS OF CARE CONVERSATION - ADVANCED CARE PLANNING - CONVERSATION DETAILS
I updated patient on her condition, including new Afib and need for Pacemaker which was already placed. Pt states she wants to remain full code at this time, looking forward to spend more time with her expanding family including her grandchildren. She hopes to avoid CPR or a ventilator, but if it were necessary she would want them at this time.

## 2022-07-29 NOTE — DISCHARGE NOTE NURSING/CASE MANAGEMENT/SOCIAL WORK - NSDCPEFALRISK_GEN_ALL_CORE
For information on Fall & Injury Prevention, visit: [email or web address] OR  [email or web address] OR  https://www.cdc.gov/steadi/patient.html

## 2022-07-29 NOTE — DISCHARGE NOTE NURSING/CASE MANAGEMENT/SOCIAL WORK - NSDCCRNAME_GEN_ALL_CORE_FT
[facility] ([facility], [place] [address])

## 2022-07-29 NOTE — PROGRESS NOTE ADULT - REASON FOR ADMISSION
EP eval

## 2022-07-29 NOTE — PROGRESS NOTE ADULT - NUTRITIONAL ASSESSMENT
This patient has been assessed with a concern for Malnutrition and has been determined to have a diagnosis/diagnoses of Morbid obesity (BMI > 40).    This patient is being managed with:   Diet Regular-  Consistent Carbohydrate {Evening Snack} (CSTCHOSN)  DASH/TLC {Sodium & Cholesterol Restricted} (DASH)  Entered: Jul 28 2022  2:48PM

## 2022-07-29 NOTE — PROGRESS NOTE ADULT - PROBLEM SELECTOR PLAN 4
-c/w losartan and metoprolol

## 2022-07-29 NOTE — PROGRESS NOTE ADULT - PROBLEM SELECTOR PLAN 2
--Given syncope and pauses <3 sec in afib  -CHADSVASc score of at least 4.  resume eliquis 5 mg bid tomorrow 7/29  -c/w metoprolol 25mg for rate control  -- TTE in [facility] showed EF 55-60%, dilated LA, normal RV  -ischemic workup at OSH significant for large sized mod severity infarct with waqar-infarct ischemia apical and distal anteroseptal. [facility] luminal irregularities   -s/p PPM 7/26  -monitor on tele --Given syncope and pauses <3 sec in afib  -CHADSVASc score of at least 4.  resume eliquis 5 mg bid 7/29  -c/w metoprolol 25mg for rate control  -- TTE in [facility] showed EF 55-60%, dilated LA, normal RV  -ischemic workup at OSH significant for large sized mod severity infarct with waqar-infarct ischemia apical and distal anteroseptal. [facility] luminal irregularities   -s/p PPM 7/26  -monitor on tele

## 2022-07-29 NOTE — PROGRESS NOTE ADULT - SUBJECTIVE AND OBJECTIVE BOX
Patient seen and examined at bedside.    Overnight Events:   No events. Denies CP, palpitations, SOB.     REVIEW OF SYSTEMS:  Constitutional:     [x ] negative [ ] fevers [ ] chills [ ] weight loss [ ] weight gain  HEENT:                  [x ] negative [ ] dry eyes [ ] eye irritation [ ] postnasal drip [ ] nasal congestion  CV:                         [ x] negative  [ ] chest pain [ ] orthopnea [ ] palpitations [ ] murmur  Resp:                     [x ] negative [ ] cough [ ] shortness of breath [ ] dyspnea [ ] wheezing [ ] sputum [ ]hemoptysis  GI:                          [x ] negative [ ] nausea [ ] vomiting [ ] diarrhea [ ] constipation [ ] abd pain [ ] dysphagia   :                        [ x] negative [ ] dysuria [ ] nocturia [ ] hematuria [ ] increased urinary frequency  Musculoskeletal: [x ] negative [ ] back pain [ ] myalgias [ ] arthralgias [ ] fracture  Skin:                       [ x] negative [ ] rash [ ] itch  Neurological:        [ x] negative [ ] headache [ ] dizziness [ ] syncope [ ] weakness [ ] numbness  Psychiatric:           [ x] negative [ ] anxiety [ ] depression  Endocrine:            [ x] negative [ ] diabetes [ ] thyroid problem  Heme/Lymph:      [ x] negative [ ] anemia [ ] bleeding problem  Allergic/Immune: [ x] negative [ ] itchy eyes [ ] nasal discharge [ ] hives [ ] angioedema    [ x] All other systems negative          Current Meds:  acetaminophen     Tablet .. 650 milliGRAM(s) Oral every 6 hours PRN  ascorbic acid 500 milliGRAM(s) Oral daily  aspirin enteric coated 81 milliGRAM(s) Oral daily  atorvastatin 40 milliGRAM(s) Oral at bedtime  calcium carbonate 1250 mG  + Vitamin D (OsCal 500 + D) 1 Tablet(s) Oral two times a day  heparin   Injectable 9000 Unit(s) IV Push every 6 hours PRN  heparin   Injectable 4000 Unit(s) IV Push every 6 hours PRN  heparin  Infusion. 1200 Unit(s)/Hr IV Continuous <Continuous>  losartan 25 milliGRAM(s) Oral daily  metoprolol succinate ER 25 milliGRAM(s) Oral daily  multivitamin 1 Tablet(s) Oral daily  nystatin Powder 1 Application(s) Topical every 12 hours  traMADol 50 milliGRAM(s) Oral every 6 hours PRN  zinc sulfate 220 milliGRAM(s) Oral daily      Vitals:  T(F): 97.6 (07-26), Max: 98.1 (07-25)  HR: 67 (07-26) (64 - 72)  BP: 130/71 (07-26) (109/59 - 130/71)  RR: 18 (07-26)  SpO2: 99% (07-26)  I&O's Summary    25 Jul 2022 07:01  -  26 Jul 2022 07:00  --------------------------------------------------------  IN: 138 mL / OUT: 600 mL / NET: -462 mL      PHYSICAL EXAM:  Appearance: No acute distress; well appearing  Eyes: no scleral icterus   HEENT: Normal oral mucosa. +periorbital ecchymoses (improving)  Cardiovascular: RRR, S1, S2, no murmurs, rubs, or gallops; no edema; no JVD  Respiratory: Clear to auscultation bilaterally, no auditory stridor   Gastrointestinal: soft, non-tender, non-distended with normal bowel sounds  Musculoskeletal: No clubbing; no joint deformity   Neurologic: Moving all 4 extremities spontaneously, sensation grossly intact  Psychiatry: AAOx3, mood & affect appropriate  Skin: No rashes, ecchymoses, or cyanosis. R wrist access site cdi.                           12.2   4.56  )-----------( 190      ( 26 Jul 2022 07:30 )             38.2     07-26    139  |  107  |  18  ----------------------------<  93  3.9   |  21<L>  |  0.77    Ca    9.1      26 Jul 2022 07:30  Phos  3.2     07-26  Mg     1.70     07-26    TPro  6.5  /  Alb  3.2<L>  /  TBili  0.5  /  DBili  x   /  AST  34<H>  /  ALT  19  /  AlkPhos  102  07-26    PT/INR - ( 26 Jul 2022 07:30 )   PT: 15.9 sec;   INR: 1.37 ratio         PTT - ( 26 Jul 2022 07:30 )  PTT:137.9 sec              DIAGNOSTIC/IMAGING:  LHC with no significant CAD lesions
Patient seen and examined at bedside.    Overnight Events:   No events. Patients bruising better. No CP or sob.     REVIEW OF SYSTEMS:  Constitutional:     [x ] negative [ ] fevers [ ] chills [ ] weight loss [ ] weight gain  HEENT:                  [x ] negative [ ] dry eyes [ ] eye irritation [ ] postnasal drip [ ] nasal congestion  CV:                         [ x] negative  [ ] chest pain [ ] orthopnea [ ] palpitations [ ] murmur  Resp:                     [x ] negative [ ] cough [ ] shortness of breath [ ] dyspnea [ ] wheezing [ ] sputum [ ]hemoptysis  GI:                          [ x] negative [ ] nausea [ ] vomiting [ ] diarrhea [ ] constipation [ ] abd pain [ ] dysphagia   :                        [ x] negative [ ] dysuria [ ] nocturia [ ] hematuria [ ] increased urinary frequency  Musculoskeletal: [x ] negative [ ] back pain [ ] myalgias [ ] arthralgias [ ] fracture  Skin:                       [ x] negative [ ] rash [ ] itch  Neurological:        [ x] negative [ ] headache [ ] dizziness [ ] syncope [ ] weakness [ ] numbness  Psychiatric:           [ x] negative [ ] anxiety [ ] depression  Endocrine:            [ x] negative [ ] diabetes [ ] thyroid problem  Heme/Lymph:      [ x] negative [ ] anemia [ ] bleeding problem  Allergic/Immune: [ x] negative [ ] itchy eyes [ ] nasal discharge [ ] hives [ ] angioedema    [ x] All other systems negative          Current Meds:  acetaminophen     Tablet .. 650 milliGRAM(s) Oral every 6 hours PRN  ascorbic acid 500 milliGRAM(s) Oral daily  aspirin enteric coated 81 milliGRAM(s) Oral daily  atorvastatin 40 milliGRAM(s) Oral at bedtime  calcium carbonate 1250 mG  + Vitamin D (OsCal 500 + D) 1 Tablet(s) Oral two times a day  heparin   Injectable 9000 Unit(s) IV Push every 6 hours PRN  heparin   Injectable 4000 Unit(s) IV Push every 6 hours PRN  heparin  Infusion.  Unit(s)/Hr IV Continuous <Continuous>  losartan 25 milliGRAM(s) Oral daily  metoprolol succinate ER 25 milliGRAM(s) Oral daily  multivitamin 1 Tablet(s) Oral daily  nystatin Powder 1 Application(s) Topical every 12 hours  traMADol 50 milliGRAM(s) Oral every 6 hours PRN  zinc sulfate 220 milliGRAM(s) Oral daily      Vitals:  T(F): 98 (07-25), Max: 98.3 (07-24)  HR: 65 (07-25) (60 - 65)  BP: 109/59 (07-25) (105/65 - 118/69)  RR: 18 (07-25)  SpO2: 97% (07-25)  I&O's Summary    25 Jul 2022 07:01  -  25 Jul 2022 13:46  --------------------------------------------------------  IN: 114 mL / OUT: 600 mL / NET: -486 mL        Physical Exam:  Appearance: No acute distress  HEENT:   periorbital ecchymoses  Cardiovascular: S1 S2, no elevated JVP, no murmurs  Respiratory: Lungs clear to auscultation, good air movement  Psychiatry: A & O x 3, Mood & affect appropriate  Gastrointestinal:  soft nt	  Skin: no cyanosis	  Neurologic: grossly non-focal                          11.9   4.62  )-----------( 172      ( 25 Jul 2022 05:23 )             36.0     07-25    141  |  106  |  20  ----------------------------<  94  4.0   |  23  |  0.78    Ca    9.2      25 Jul 2022 05:23  Phos  3.3     07-25  Mg     1.50     07-25      PTT - ( 25 Jul 2022 12:36 )  PTT:46.6 sec              New ECG(s): Personally reviewed    
[facility] Division of Hospital Medicine  Brent Barker MD  Pager (M-F, 8A-5P): [phone]      SUBJECTIVE / OVERNIGHT EVENTS: NAEON. Above goal on Hep gtt, adjusted per normogram. Normal Swallow eval today. Denies fevers/chills/HA/N/V/D/cC  ADDITIONAL REVIEW OF SYSTEMS:    MEDICATIONS  (STANDING):  acetaminophen     Tablet .. 650 milliGRAM(s) Oral once  ascorbic acid 500 milliGRAM(s) Oral daily  aspirin enteric coated 81 milliGRAM(s) Oral daily  atorvastatin 40 milliGRAM(s) Oral at bedtime  calcium carbonate 1250 mG  + Vitamin D (OsCal 500 + D) 1 Tablet(s) Oral two times a day  heparin  Infusion.  Unit(s)/Hr (19 mL/Hr) IV Continuous <Continuous>  losartan 25 milliGRAM(s) Oral daily  metoprolol succinate ER 25 milliGRAM(s) Oral daily  multivitamin 1 Tablet(s) Oral daily  nystatin Powder 1 Application(s) Topical every 12 hours  zinc sulfate 220 milliGRAM(s) Oral daily    MEDICATIONS  (PRN):  heparin   Injectable 9000 Unit(s) IV Push every 6 hours PRN For aPTT less than 40  heparin   Injectable 4000 Unit(s) IV Push every 6 hours PRN For aPTT between 40 - 57  traMADol 50 milliGRAM(s) Oral every 6 hours PRN Severe Pain (7 - 10)      I&O's Summary    22 Jul 2022 07:01  -  23 Jul 2022 07:00  --------------------------------------------------------  IN: 0 mL / OUT: 500 mL / NET: -500 mL    23 Jul 2022 07:01  -  23 Jul 2022 15:10  --------------------------------------------------------  IN: 668 mL / OUT: 600 mL / NET: 68 mL        PHYSICAL EXAM:  Vital Signs Last 24 Hrs  T(C): 37 (23 Jul 2022 13:40), Max: 37 (23 Jul 2022 13:40)  T(F): 98.6 (23 Jul 2022 13:40), Max: 98.6 (23 Jul 2022 13:40)  HR: 66 (23 Jul 2022 13:40) (63 - 72)  BP: 104/71 (23 Jul 2022 13:40) (104/71 - 121/76)  BP(mean): --  RR: 18 (23 Jul 2022 13:40) (18 - 18)  SpO2: 99% (23 Jul 2022 13:40) (97% - 99%)    Parameters below as of 23 Jul 2022 13:40  Patient On (Oxygen Delivery Method): room air      CONSTITUTIONAL: NAD, well-developed, well-groomed  EYES: PERRLA; conjunctiva and sclera clear. Noted ecchymoses under eyes and on blood draw sites  ENMT: Moist oral mucosa, no pharyngeal injection or exudates; normal dentition  NECK: Supple, no palpable masses; no thyromegaly  RESPIRATORY: Normal respiratory effort; lungs are clear to auscultation bilaterally  CARDIOVASCULAR: Regular rate and rhythm, normal S1 and S2, no murmur/rub/gallop; No lower extremity edema; Peripheral pulses are 2+ bilaterally  ABDOMEN: Nontender to palpation, normoactive bowel sounds, no rebound/guarding; No hepatosplenomegaly  MUSCULOSKELETAL:  Normal gait; no clubbing or cyanosis of digits; no joint swelling or tenderness to palpation  PSYCH: A+O to person, place, and time; affect appropriate  NEUROLOGY: CN 2-12 are intact and symmetric; no gross sensory deficits   SKIN: No rashes; no palpable lesions    LABS:                        12.2   4.50  )-----------( 190      ( 23 Jul 2022 08:00 )             36.6     07-23    137  |  103  |  19  ----------------------------<  98  4.7   |  20<L>  |  0.71    Ca    9.2      23 Jul 2022 08:00  Phos  3.3     07-23  Mg     1.60     07-23    TPro  6.4  /  Alb  3.0<L>  /  TBili  0.4  /  DBili  x   /  AST  32  /  ALT  15  /  AlkPhos  95  07-22    PT/INR - ( 22 Jul 2022 01:12 )   PT: 15.8 sec;   INR: 1.36 ratio         PTT - ( 23 Jul 2022 08:00 )  PTT:>200.0 sec            RADIOLOGY & ADDITIONAL TESTS:  Results Reviewed:   Imaging Personally Reviewed:  Electrocardiogram Personally Reviewed:    COORDINATION OF CARE:  Care Discussed with Consultants/Other Providers [Y/N]:  Prior or Outpatient Records Reviewed [Y/N]:  
Interval history:  No acute overnight event  S/p [facility] with no intervention  NPO for anticipated PPM   denies palpitation, CP, SOB.      PAST MEDICAL & SURGICAL HISTORY:  Obesity, morbid    Diabetes    Cellulitis    Bacterial septicemia    Edema leg    S/P hysterectomy    S/P appendectomy    S/P exploratory laparotomy  due to post-op infection from appendectomy as per patient    S/P breast lumpectomy  benign mass        MEDICATIONS  (STANDING):  ascorbic acid 500 milliGRAM(s) Oral daily  aspirin enteric coated 81 milliGRAM(s) Oral daily  atorvastatin 40 milliGRAM(s) Oral at bedtime  calcium carbonate 1250 mG  + Vitamin D (OsCal 500 + D) 1 Tablet(s) Oral two times a day  heparin  Infusion. 1200 Unit(s)/Hr (12 mL/Hr) IV Continuous <Continuous>  losartan 25 milliGRAM(s) Oral daily  metoprolol succinate ER 25 milliGRAM(s) Oral daily  multivitamin 1 Tablet(s) Oral daily  nystatin Powder 1 Application(s) Topical every 12 hours  zinc sulfate 220 milliGRAM(s) Oral daily    MEDICATIONS  (PRN):  acetaminophen     Tablet .. 650 milliGRAM(s) Oral every 6 hours PRN Mild Pain (1 - 3), Moderate Pain (4 - 6)  heparin   Injectable 9000 Unit(s) IV Push every 6 hours PRN For aPTT less than 40  heparin   Injectable 4000 Unit(s) IV Push every 6 hours PRN For aPTT between 40 - 57  traMADol 50 milliGRAM(s) Oral every 6 hours PRN Severe Pain (7 - 10)            Vital Signs Last 24 Hrs  T(C): 36.4 (26 Jul 2022 05:25), Max: 36.7 (25 Jul 2022 13:25)  T(F): 97.6 (26 Jul 2022 05:25), Max: 98.1 (25 Jul 2022 21:05)  HR: 67 (26 Jul 2022 05:25) (64 - 72)  BP: 130/71 (26 Jul 2022 05:25) (109/59 - 130/71)  BP(mean): --  RR: 18 (26 Jul 2022 05:25) (18 - 18)  SpO2: 99% (26 Jul 2022 05:25) (95% - 100%)    Parameters below as of 26 Jul 2022 05:25  Patient On (Oxygen Delivery Method): room air                INTERPRETATION OF TELEMETRY: Afib at 60s    ECG:        LABS:                        12.2   4.56  )-----------( 190      ( 26 Jul 2022 07:30 )             38.2     07-26    139  |  107  |  18  ----------------------------<  93  3.9   |  21<L>  |  0.77    Ca    9.1      26 Jul 2022 07:30  Phos  3.2     07-26  Mg     1.70     07-26    TPro  6.5  /  Alb  3.2<L>  /  TBili  0.5  /  DBili  x   /  AST  34<H>  /  ALT  19  /  AlkPhos  102  07-26        PT/INR - ( 26 Jul 2022 07:30 )   PT: 15.9 sec;   INR: 1.37 ratio         PTT - ( 26 Jul 2022 07:30 )  PTT:137.9 sec    I&O's Summary    25 Jul 2022 07:01  -  26 Jul 2022 07:00  --------------------------------------------------------  IN: 138 mL / OUT: 600 mL / NET: -462 mL      BNP  RADIOLOGY & ADDITIONAL STUDIES:      PHYSICAL EXAM:    GENERAL: In no apparent distress, well nourished, and hydrated.  HEART: Irregular rate and rhythm; No murmurs, rubs, or gallops.  PULMONARY: Clear to auscultation and percussion.  No rales, wheezing, or rhonchi bilaterally.  ABDOMEN: Soft, Nontender, Nondistended; Bowel sounds present  EXTREMITIES: Peripheral Pulses presents, No clubbing, cyanosis, or edema  NEUROLOGICAL: Grossly nonfocal        
Patient seen and examined at bedside.    Overnight Events: NAEON    Review Of Systems: No chest pain, shortness of breath, or palpitations            Current Meds:  acetaminophen     Tablet .. 650 milliGRAM(s) Oral every 6 hours PRN  ascorbic acid 500 milliGRAM(s) Oral daily  aspirin enteric coated 81 milliGRAM(s) Oral daily  atorvastatin 40 milliGRAM(s) Oral at bedtime  calcium carbonate 1250 mG  + Vitamin D (OsCal 500 + D) 1 Tablet(s) Oral two times a day  heparin   Injectable 9000 Unit(s) IV Push every 6 hours PRN  heparin   Injectable 4000 Unit(s) IV Push every 6 hours PRN  heparin  Infusion.  Unit(s)/Hr IV Continuous <Continuous>  losartan 25 milliGRAM(s) Oral daily  metoprolol succinate ER 25 milliGRAM(s) Oral daily  multivitamin 1 Tablet(s) Oral daily  nystatin Powder 1 Application(s) Topical every 12 hours  traMADol 50 milliGRAM(s) Oral every 6 hours PRN  zinc sulfate 220 milliGRAM(s) Oral daily      Vitals:  T(F): 97.8 (07-25), Max: 98.3 (07-24)  HR: 60 (07-25) (60 - 65)  BP: 118/69 (07-25) (105/65 - 118/69)  RR: 18 (07-25)  SpO2: 97% (07-25)  I&O's Summary    25 Jul 2022 07:01  -  25 Jul 2022 09:47  --------------------------------------------------------  IN: 0 mL / OUT: 600 mL / NET: -600 mL      Physical Exam:  GENERAL: No acute distress, well-developed  HEAD:  Atraumatic, Normocephalic  ENT: EOMI, PERRLA, conjunctiva and sclera clear, Neck supple, No JVD, moist mucosa  CHEST/LUNG: Clear to auscultation bilaterally; No wheeze, equal breath sounds bilaterally   BACK: No spinal tenderness  HEART: irregular rhythm; No murmurs, rubs, or gallops  ABDOMEN: Soft, Nontender, Nondistended; Bowel sounds present  EXTREMITIES:  No clubbing, cyanosis, or edema  PSYCH: Nl behavior, nl affect  NEUROLOGY: AAOx3, non-focal, cranial nerves intact  SKIN: Normal color, No rashes or lesions                        11.9   4.62  )-----------( 172      ( 25 Jul 2022 05:23 )             36.0     07-25    141  |  106  |  20  ----------------------------<  94  4.0   |  23  |  0.78    Ca    9.2      25 Jul 2022 05:23  Phos  3.3     07-25  Mg     1.50     07-25      PTT - ( 24 Jul 2022 07:08 )  PTT:69.8 sec      New ECG(s): Personally reviewed      
Patient seen and examined at bedside.    Overnight Events: NAEON    Review Of Systems: No chest pain, shortness of breath, or palpitations            Current Meds:  acetaminophen     Tablet .. 650 milliGRAM(s) Oral every 6 hours PRN  ascorbic acid 500 milliGRAM(s) Oral daily  aspirin enteric coated 81 milliGRAM(s) Oral daily  atorvastatin 40 milliGRAM(s) Oral at bedtime  calcium carbonate 1250 mG  + Vitamin D (OsCal 500 + D) 1 Tablet(s) Oral two times a day  losartan 25 milliGRAM(s) Oral daily  metoprolol succinate ER 25 milliGRAM(s) Oral daily  multivitamin 1 Tablet(s) Oral daily  nystatin Powder 1 Application(s) Topical every 12 hours  traMADol 50 milliGRAM(s) Oral every 6 hours PRN  zinc sulfate 220 milliGRAM(s) Oral daily      Vitals:  T(F): 98 (07-27), Max: 98.4 (07-26)  HR: 63 (07-27) (62 - 65)  BP: 111/59 (07-27) (102/85 - 115/66)  RR: 18 (07-27)  SpO2: 98% (07-27)  I&O's Summary      Physical Exam:  Appearance: No acute distress; well appearing  Eyes: PERRL, EOMI, pink conjunctiva  HEENT: Normal oral mucosa  Cardiovascular: RRR, S1, S2, no murmurs, rubs, or gallops; no edema; no JVD  Respiratory: Clear to auscultation bilaterally  Gastrointestinal: soft, non-tender, non-distended with normal bowel sounds  Musculoskeletal: No clubbing; no joint deformity   Neurologic: Non-focal  Lymphatic: No lymphadenopathy  Psychiatry: AAOx3, mood & affect appropriate  Skin: No rashes, ecchymoses, or cyanosis                          12.4   4.85  )-----------( 194      ( 27 Jul 2022 08:15 )             38.4     07-27    138  |  107  |  19  ----------------------------<  137<H>  4.7   |  22  |  0.84    Ca    9.0      27 Jul 2022 08:15  Phos  2.9     07-27  Mg     1.60     07-27    TPro  6.5  /  Alb  3.2<L>  /  TBili  0.5  /  DBili  x   /  AST  34<H>  /  ALT  19  /  AlkPhos  102  07-26    PT/INR - ( 26 Jul 2022 07:30 )   PT: 15.9 sec;   INR: 1.37 ratio         PTT - ( 26 Jul 2022 07:30 )  PTT:137.9 sec      New ECG(s): Personally reviewed    < from: Transthoracic Echocardiogram (07.26.22 @ 14:35) >  CONCLUSIONS:  1. Mitral annular calcification and calcified mitral  leaflets with normal diastolic opening.  2. Calcified trileaflet aortic valve with normal opening.  3. Normal left ventricular systolic function. No segmental  wall motion abnormalities.  4. Normal right ventricular size and function.    < end of copied text >  
Dr. Jaylene Lala  Pager [phone]    PROGRESS NOTE:     Patient is a 82y old  Female who presents with a chief complaint of EP eval (25 Jul 2022 13:45)      SUBJECTIVE / OVERNIGHT EVENTS: pt denies chest pain or sob , afib on tele, 2.1 sec pause  ADDITIONAL REVIEW OF SYSTEMS: waiting for [facility] today    MEDICATIONS  (STANDING):  ascorbic acid 500 milliGRAM(s) Oral daily  aspirin enteric coated 81 milliGRAM(s) Oral daily  atorvastatin 40 milliGRAM(s) Oral at bedtime  calcium carbonate 1250 mG  + Vitamin D (OsCal 500 + D) 1 Tablet(s) Oral two times a day  heparin  Infusion.  Unit(s)/Hr (19 mL/Hr) IV Continuous <Continuous>  losartan 25 milliGRAM(s) Oral daily  metoprolol succinate ER 25 milliGRAM(s) Oral daily  multivitamin 1 Tablet(s) Oral daily  nystatin Powder 1 Application(s) Topical every 12 hours  zinc sulfate 220 milliGRAM(s) Oral daily    MEDICATIONS  (PRN):  acetaminophen     Tablet .. 650 milliGRAM(s) Oral every 6 hours PRN Mild Pain (1 - 3), Moderate Pain (4 - 6)  heparin   Injectable 9000 Unit(s) IV Push every 6 hours PRN For aPTT less than 40  heparin   Injectable 4000 Unit(s) IV Push every 6 hours PRN For aPTT between 40 - 57  traMADol 50 milliGRAM(s) Oral every 6 hours PRN Severe Pain (7 - 10)      CAPILLARY BLOOD GLUCOSE        I&O's Summary    25 Jul 2022 07:01  -  25 Jul 2022 15:18  --------------------------------------------------------  IN: 114 mL / OUT: 600 mL / NET: -486 mL        PHYSICAL EXAM:  Vital Signs Last 24 Hrs  T(C): 36.7 (25 Jul 2022 13:25), Max: 36.8 (24 Jul 2022 16:43)  T(F): 98 (25 Jul 2022 13:25), Max: 98.3 (24 Jul 2022 16:43)  HR: 65 (25 Jul 2022 13:25) (60 - 65)  BP: 109/59 (25 Jul 2022 13:25) (105/65 - 118/69)  BP(mean): --  RR: 18 (25 Jul 2022 13:25) (18 - 18)  SpO2: 97% (25 Jul 2022 13:25) (97% - 98%)    Parameters below as of 25 Jul 2022 13:25  Patient On (Oxygen Delivery Method): room air      CONSTITUTIONAL: NAD, well-developed, well-groomed  EYES: PERRLA; conjunctiva and sclera clear  ENMT: Moist oral mucosa, no pharyngeal injection or exudates; normal dentition  NECK: Supple, no palpable masses; no thyromegaly  RESPIRATORY: Normal respiratory effort; lungs are clear to auscultation bilaterally  CARDIOVASCULAR: irregularly irregular, normal S1 and S2, no murmur/rub/gallop; No lower extremity edema; Peripheral pulses are 2+ bilaterally  ABDOMEN: Nontender to palpation, normoactive bowel sounds, no rebound/guarding; No hepatosplenomegaly  MUSCULOSKELETAL:  Normal gait; no clubbing or cyanosis of digits; no joint swelling or tenderness to palpation  PSYCH: A+O to person, place, and time; affect appropriate  NEUROLOGY: CN 2-12 are intact and symmetric; no gross sensory deficits   SKIN: No rashes; no palpable lesions    LABS:                        11.9   4.62  )-----------( 172      ( 25 Jul 2022 05:23 )             36.0     07-25    141  |  106  |  20  ----------------------------<  94  4.0   |  23  |  0.78    Ca    9.2      25 Jul 2022 05:23  Phos  3.3     07-25  Mg     1.50     07-25      PTT - ( 25 Jul 2022 12:36 )  PTT:46.6 sec            RADIOLOGY & ADDITIONAL TESTS:  Results Reviewed:   Imaging Personally Reviewed:  Electrocardiogram Personally Reviewed:    COORDINATION OF CARE:  Care Discussed with Consultants/Other Providers [Y/N]:  Prior or Outpatient Records Reviewed [Y/N]:  
Dr. Jaylene Lala  Pager [phone]    PROGRESS NOTE:     Patient is a 82y old  Female who presents with a chief complaint of EP eval (27 Jul 2022 09:48)      SUBJECTIVE / OVERNIGHT EVENTS: pt denies chest pain or sob   ADDITIONAL REVIEW OF SYSTEMS: c/o soreness at pacemaker site     MEDICATIONS  (STANDING):  ascorbic acid 500 milliGRAM(s) Oral daily  aspirin enteric coated 81 milliGRAM(s) Oral daily  atorvastatin 40 milliGRAM(s) Oral at bedtime  calcium carbonate 1250 mG  + Vitamin D (OsCal 500 + D) 1 Tablet(s) Oral two times a day  losartan 25 milliGRAM(s) Oral daily  metoprolol succinate ER 25 milliGRAM(s) Oral daily  multivitamin 1 Tablet(s) Oral daily  nystatin Powder 1 Application(s) Topical every 12 hours  zinc sulfate 220 milliGRAM(s) Oral daily    MEDICATIONS  (PRN):  acetaminophen     Tablet .. 650 milliGRAM(s) Oral every 6 hours PRN Mild Pain (1 - 3), Moderate Pain (4 - 6)  traMADol 50 milliGRAM(s) Oral every 6 hours PRN Severe Pain (7 - 10)      CAPILLARY BLOOD GLUCOSE        I&O's Summary      PHYSICAL EXAM:  Vital Signs Last 24 Hrs  T(C): 36.7 (27 Jul 2022 06:20), Max: 36.9 (26 Jul 2022 17:51)  T(F): 98 (27 Jul 2022 06:20), Max: 98.4 (26 Jul 2022 17:51)  HR: 63 (27 Jul 2022 06:20) (62 - 65)  BP: 111/59 (27 Jul 2022 06:20) (102/85 - 115/66)  BP(mean): --  RR: 18 (27 Jul 2022 06:20) (18 - 18)  SpO2: 98% (27 Jul 2022 06:20) (96% - 99%)    Parameters below as of 27 Jul 2022 06:20  Patient On (Oxygen Delivery Method): room air      CONSTITUTIONAL: NAD, well-developed, well-groomed  EYES: PERRLA; conjunctiva and sclera clear  ENMT: Moist oral mucosa, no pharyngeal injection or exudates; normal dentition  NECK: Supple, no palpable masses; no thyromegaly  RESPIRATORY: Normal respiratory effort; lungs are clear to auscultation bilaterally  CARDIOVASCULAR: irregularly irregular, normal S1 and S2, no murmur/rub/gallop; No lower extremity edema; Peripheral pulses are 2+ bilaterally  ABDOMEN: Nontender to palpation, normoactive bowel sounds, no rebound/guarding; No hepatosplenomegaly  MUSCULOSKELETAL:  Normal gait; no clubbing or cyanosis of digits; no joint swelling or tenderness to palpation  PSYCH: A+O to person, place, and time; affect appropriate  NEUROLOGY: CN 2-12 are intact and symmetric; no gross sensory deficits   SKIN: facial periorbital ecchymosis   LABS:                        12.4   4.85  )-----------( 194      ( 27 Jul 2022 08:15 )             38.4     07-27    138  |  107  |  19  ----------------------------<  137<H>  4.7   |  22  |  0.84    Ca    9.0      27 Jul 2022 08:15  Phos  2.9     07-27  Mg     1.60     07-27    TPro  6.5  /  Alb  3.2<L>  /  TBili  0.5  /  DBili  x   /  AST  34<H>  /  ALT  19  /  AlkPhos  102  07-26    PT/INR - ( 26 Jul 2022 07:30 )   PT: 15.9 sec;   INR: 1.37 ratio         PTT - ( 26 Jul 2022 07:30 )  PTT:137.9 sec            RADIOLOGY & ADDITIONAL TESTS:  Results Reviewed:   Imaging Personally Reviewed:  Electrocardiogram Personally Reviewed:    COORDINATION OF CARE:  Care Discussed with Consultants/Other Providers [Y/N]:  Prior or Outpatient Records Reviewed [Y/N]:  
Dr. Jaylene Lala  Pager [phone]    PROGRESS NOTE:     Patient is a 82y old  Female who presents with a chief complaint of EP eval (26 Jul 2022 12:39)      SUBJECTIVE / OVERNIGHT EVENTS: pt denies chest pain or sob   ADDITIONAL REVIEW OF SYSTEMS: afebrile, eager to get PPM    MEDICATIONS  (STANDING):  ascorbic acid 500 milliGRAM(s) Oral daily  aspirin enteric coated 81 milliGRAM(s) Oral daily  atorvastatin 40 milliGRAM(s) Oral at bedtime  calcium carbonate 1250 mG  + Vitamin D (OsCal 500 + D) 1 Tablet(s) Oral two times a day  losartan 25 milliGRAM(s) Oral daily  metoprolol succinate ER 25 milliGRAM(s) Oral daily  multivitamin 1 Tablet(s) Oral daily  nystatin Powder 1 Application(s) Topical every 12 hours  zinc sulfate 220 milliGRAM(s) Oral daily    MEDICATIONS  (PRN):  acetaminophen     Tablet .. 650 milliGRAM(s) Oral every 6 hours PRN Mild Pain (1 - 3), Moderate Pain (4 - 6)  traMADol 50 milliGRAM(s) Oral every 6 hours PRN Severe Pain (7 - 10)      CAPILLARY BLOOD GLUCOSE      POCT Blood Glucose.: 85 mg/dL (26 Jul 2022 12:09)    I&O's Summary    25 Jul 2022 07:01  -  26 Jul 2022 07:00  --------------------------------------------------------  IN: 138 mL / OUT: 600 mL / NET: -462 mL        PHYSICAL EXAM:  Vital Signs Last 24 Hrs  T(C): 36.4 (26 Jul 2022 05:25), Max: 36.7 (25 Jul 2022 13:25)  T(F): 97.6 (26 Jul 2022 05:25), Max: 98.1 (25 Jul 2022 21:05)  HR: 67 (26 Jul 2022 05:25) (64 - 72)  BP: 130/71 (26 Jul 2022 05:25) (109/59 - 130/71)  BP(mean): --  RR: 18 (26 Jul 2022 05:25) (18 - 18)  SpO2: 99% (26 Jul 2022 05:25) (95% - 100%)    Parameters below as of 26 Jul 2022 05:25  Patient On (Oxygen Delivery Method): room air      CONSTITUTIONAL: NAD, well-developed, well-groomed  EYES: PERRLA; conjunctiva and sclera clear  ENMT: Moist oral mucosa, no pharyngeal injection or exudates; normal dentition  NECK: Supple, no palpable masses; no thyromegaly  RESPIRATORY: Normal respiratory effort; lungs are clear to auscultation bilaterally  CARDIOVASCULAR: irregularly irregular, normal S1 and S2, no murmur/rub/gallop; No lower extremity edema; Peripheral pulses are 2+ bilaterally  ABDOMEN: Nontender to palpation, normoactive bowel sounds, no rebound/guarding; No hepatosplenomegaly  MUSCULOSKELETAL:  Normal gait; no clubbing or cyanosis of digits; no joint swelling or tenderness to palpation  PSYCH: A+O to person, place, and time; affect appropriate  NEUROLOGY: CN 2-12 are intact and symmetric; no gross sensory deficits   SKIN: facial periorbital ecchymosis     LABS:                        12.2   4.56  )-----------( 190      ( 26 Jul 2022 07:30 )             38.2     07-26    139  |  107  |  18  ----------------------------<  93  3.9   |  21<L>  |  0.77    Ca    9.1      26 Jul 2022 07:30  Phos  3.2     07-26  Mg     1.70     07-26    TPro  6.5  /  Alb  3.2<L>  /  TBili  0.5  /  DBili  x   /  AST  34<H>  /  ALT  19  /  AlkPhos  102  07-26    PT/INR - ( 26 Jul 2022 07:30 )   PT: 15.9 sec;   INR: 1.37 ratio         PTT - ( 26 Jul 2022 07:30 )  PTT:137.9 sec            RADIOLOGY & ADDITIONAL TESTS:  Results Reviewed:   Imaging Personally Reviewed:    Electrocardiogram Personally Reviewed:    COORDINATION OF CARE:  Care Discussed with Consultants/Other Providers [Y/N]: JAMIE dempsey today  Prior or Outpatient Records Reviewed [Y/N]:  
Patient is a 82y old  Female who presents with a chief complaint of EP eval (22 Jul 2022 09:00)      INTERVAL HPI/OVERNIGHT EVENTS: No acute overnight events. This AM pt complaining of her chronic back pain which she states is due to her spinal stenosis. She denies chest pain, SOB, abd pain, n/v/d, fevers, chills.       REVIEW OF SYSTEMS:  Constitutional:     [ ] negative [ ] fevers [ ] chills [ ] weight loss [ ] weight gain  HEENT:                  [ ] negative [ ] dry eyes [ ] eye irritation [ ] postnasal drip [ ] nasal congestion  CV:                         [ ] negative  [ ] chest pain [ ] orthopnea [ ] palpitations [ ] murmur  Resp:                     [ ] negative [ ] cough [ ] shortness of breath [ ] dyspnea [ ] wheezing [ ] sputum [ ] hemoptysis  GI:                          [ ] negative [ ] nausea [ ] vomiting [ ] diarrhea [ ] constipation [ ] abd pain [ ] dysphagia   :                        [ ] negative [ ] dysuria [ ] nocturia [ ] hematuria [ ] increased urinary frequency  Musculoskeletal: [ ] negative [ ] back pain [ ] myalgias [ ] arthralgias [ ] fracture  Skin:                       [ ] negative [ ] rash [ ] itch  Neurological:        [ ] negative [ ] headache [ ] dizziness [ ] syncope [ ] weakness [ ] numbness  Psychiatric:           [ ] negative [ ] anxiety [ ] depression  Endocrine:            [ ] negative [ ] diabetes [ ] thyroid problem  Heme/Lymph:      [ ] negative [ ] anemia [ ] bleeding problem  Allergic/Immune: [ ] negative [ ] itchy eyes [ ] nasal discharge [ ] hives [ ] angioedema    [x] All other systems negative    Vital Signs Last 24 Hrs  T(C): 36.9 (22 Jul 2022 10:11), Max: 37.2 (21 Jul 2022 20:40)  T(F): 98.5 (22 Jul 2022 10:11), Max: 98.9 (21 Jul 2022 20:40)  HR: 70 (22 Jul 2022 10:11) (69 - 78)  BP: 125/72 (22 Jul 2022 10:11) (124/69 - 139/74)  BP(mean): --  RR: 18 (22 Jul 2022 10:11) (17 - 18)  SpO2: 98% (22 Jul 2022 10:11) (97% - 98%)    Parameters below as of 22 Jul 2022 10:11  Patient On (Oxygen Delivery Method): room air      I&O's Summary      PHYSICAL EXAM:  General: NAD  HEENT: PERRLA, EOMI, moist mucous membranes  Neurology:  nonfocal, HANSON x 4  Respiratory: CTA B/L, normal respiratory effort, no wheezes, crackles, rales  CV: RRR, S1S2, no murmurs, rubs or gallops, No edema,   Abdominal: Soft, NT, ND +BS, Last BM  Extremities: + peripheral pulses  Psych: A&Ox3, normal mood and affect  Skin: forehead ecchymosis with b/l eye bruising    LABS:                        12.1   4.27  )-----------( 180      ( 22 Jul 2022 01:12 )             37.9     Hb Trend: 12.1<--  WBC Trend: 4.27<--  Plt Trend: 180<--          07-22    140  |  107  |  19  ----------------------------<  93  4.2   |  21<L>  |  0.78    Ca    9.1      22 Jul 2022 01:12  Phos  3.0     07-22  Mg     1.50     07-22    TPro  6.4  /  Alb  3.0<L>  /  TBili  0.4  /  DBili  x   /  AST  32  /  ALT  15  /  AlkPhos  95  07-22      PT/INR - ( 22 Jul 2022 01:12 )   PT: 15.8 sec;   INR: 1.36 ratio         PTT - ( 22 Jul 2022 01:12 )  PTT:42.9 sec    CAPILLARY BLOOD GLUCOSE                  RADIOLOGY & ADDITIONAL TESTS:    Imaging Personally Reviewed:  [ ] YES  [ ] NO [x] No New Imaging Last 24hrs    Consultant(s) Notes Reviewed:  [x] YES  [ ] NO    Care Discussed with Consultants/Other Providers [x] YES  [ ] NO
[facility] Division of Hospital Medicine  Brent Barker MD  Pager (M-F, 8A-5P): [phone]      SUBJECTIVE / OVERNIGHT EVENTS: NAEON. Patient planned for [facility] 7/24. Patient denies fevers/chills/HA/N/V/D/C  ADDITIONAL REVIEW OF SYSTEMS:    MEDICATIONS  (STANDING):  ascorbic acid 500 milliGRAM(s) Oral daily  aspirin enteric coated 81 milliGRAM(s) Oral daily  atorvastatin 40 milliGRAM(s) Oral at bedtime  calcium carbonate 1250 mG  + Vitamin D (OsCal 500 + D) 1 Tablet(s) Oral two times a day  heparin  Infusion.  Unit(s)/Hr (19 mL/Hr) IV Continuous <Continuous>  losartan 25 milliGRAM(s) Oral daily  metoprolol succinate ER 25 milliGRAM(s) Oral daily  multivitamin 1 Tablet(s) Oral daily  nystatin Powder 1 Application(s) Topical every 12 hours  zinc sulfate 220 milliGRAM(s) Oral daily    MEDICATIONS  (PRN):  acetaminophen     Tablet .. 650 milliGRAM(s) Oral every 6 hours PRN Mild Pain (1 - 3), Moderate Pain (4 - 6)  heparin   Injectable 9000 Unit(s) IV Push every 6 hours PRN For aPTT less than 40  heparin   Injectable 4000 Unit(s) IV Push every 6 hours PRN For aPTT between 40 - 57  traMADol 50 milliGRAM(s) Oral every 6 hours PRN Severe Pain (7 - 10)      I&O's Summary    23 Jul 2022 07:01  -  24 Jul 2022 07:00  --------------------------------------------------------  IN: 967 mL / OUT: 600 mL / NET: 367 mL        PHYSICAL EXAM:  Vital Signs Last 24 Hrs  T(C): 36.5 (24 Jul 2022 05:50), Max: 37.1 (23 Jul 2022 17:40)  T(F): 97.7 (24 Jul 2022 05:50), Max: 98.8 (23 Jul 2022 17:40)  HR: 62 (24 Jul 2022 05:50) (62 - 71)  BP: 111/70 (24 Jul 2022 05:50) (103/52 - 111/70)  BP(mean): --  RR: 18 (24 Jul 2022 05:50) (17 - 18)  SpO2: 98% (24 Jul 2022 05:50) (96% - 98%)    Parameters below as of 24 Jul 2022 05:50  Patient On (Oxygen Delivery Method): room air      CONSTITUTIONAL: NAD, well-developed, well-groomed  EYES: PERRLA; conjunctiva and sclera clear  ENMT: Moist oral mucosa, no pharyngeal injection or exudates; normal dentition  NECK: Supple, no palpable masses; no thyromegaly  RESPIRATORY: Normal respiratory effort; lungs are clear to auscultation bilaterally  CARDIOVASCULAR: Regular rate and rhythm, normal S1 and S2, no murmur/rub/gallop; No lower extremity edema; Peripheral pulses are 2+ bilaterally  ABDOMEN: Nontender to palpation, normoactive bowel sounds, no rebound/guarding; No hepatosplenomegaly  MUSCULOSKELETAL:  Normal gait; no clubbing or cyanosis of digits; no joint swelling or tenderness to palpation  PSYCH: A+O to person, place, and time; affect appropriate  NEUROLOGY: CN 2-12 are intact and symmetric; no gross sensory deficits   SKIN: No rashes; no palpable lesions    LABS:                        12.3   4.76  )-----------( 179      ( 24 Jul 2022 05:40 )             36.4     07-24    134<L>  |  103  |  22  ----------------------------<  85  3.9   |  22  |  0.81    Ca    9.2      24 Jul 2022 05:40  Phos  3.3     07-24  Mg     1.60     07-24      PTT - ( 24 Jul 2022 07:08 )  PTT:69.8 sec            RADIOLOGY & ADDITIONAL TESTS:  Results Reviewed:   Imaging Personally Reviewed:  Electrocardiogram Personally Reviewed:    COORDINATION OF CARE:  Care Discussed with Consultants/Other Providers [Y/N]:  Prior or Outpatient Records Reviewed [Y/N]:  
Dr. Jaylene Lala  Pager [phone]    PROGRESS NOTE:     Patient is a 82y old  Female who presents with a chief complaint of EP eval (27 Jul 2022 14:09)      SUBJECTIVE / OVERNIGHT EVENTS: pt denies chest pain or sob   ADDITIONAL REVIEW OF SYSTEMS: afebrile     MEDICATIONS  (STANDING):  ascorbic acid 500 milliGRAM(s) Oral daily  aspirin enteric coated 81 milliGRAM(s) Oral daily  atorvastatin 40 milliGRAM(s) Oral at bedtime  calcium carbonate 1250 mG  + Vitamin D (OsCal 500 + D) 1 Tablet(s) Oral two times a day  losartan 25 milliGRAM(s) Oral daily  metoprolol succinate ER 25 milliGRAM(s) Oral daily  multivitamin 1 Tablet(s) Oral daily  nystatin Powder 1 Application(s) Topical every 12 hours  zinc sulfate 220 milliGRAM(s) Oral daily    MEDICATIONS  (PRN):  acetaminophen     Tablet .. 650 milliGRAM(s) Oral every 6 hours PRN Mild Pain (1 - 3), Moderate Pain (4 - 6)  traMADol 50 milliGRAM(s) Oral every 6 hours PRN Severe Pain (7 - 10)      CAPILLARY BLOOD GLUCOSE        I&O's Summary      PHYSICAL EXAM:  Vital Signs Last 24 Hrs  T(C): 36.8 (28 Jul 2022 09:00), Max: 36.8 (27 Jul 2022 21:06)  T(F): 98.3 (28 Jul 2022 09:00), Max: 98.3 (28 Jul 2022 09:00)  HR: 64 (28 Jul 2022 09:00) (59 - 87)  BP: 112/51 (28 Jul 2022 09:00) (103/52 - 125/60)  BP(mean): --  RR: 17 (28 Jul 2022 09:00) (17 - 19)  SpO2: 100% (28 Jul 2022 09:00) (98% - 100%)    Parameters below as of 28 Jul 2022 09:00  Patient On (Oxygen Delivery Method): room air      CONSTITUTIONAL: NAD, well-developed, well-groomed  EYES: PERRLA; conjunctiva and sclera clear  ENMT: Moist oral mucosa, no pharyngeal injection or exudates; normal dentition  NECK: Supple, no palpable masses; no thyromegaly  RESPIRATORY: Normal respiratory effort; lungs are clear to auscultation bilaterally  CARDIOVASCULAR: irregularly irregular, normal S1 and S2, no murmur/rub/gallop; No lower extremity edema; Peripheral pulses are 2+ bilaterally  ABDOMEN: Nontender to palpation, normoactive bowel sounds, no rebound/guarding; No hepatosplenomegaly  MUSCULOSKELETAL:  Normal gait; no clubbing or cyanosis of digits; no joint swelling or tenderness to palpation  PSYCH: A+O to person, place, and time; affect appropriate  NEUROLOGY: CN 2-12 are intact and symmetric; no gross sensory deficits   SKIN: facial periorbital ecchymosis     LABS:                        11.6   5.10  )-----------( 159      ( 28 Jul 2022 04:44 )             36.3     07-28    139  |  108<H>  |  21  ----------------------------<  115<H>  5.0   |  23  |  0.94    Ca    9.1      28 Jul 2022 04:44  Phos  2.7     07-28  Mg     1.60     07-28        RADIOLOGY & ADDITIONAL TESTS:  Results Reviewed:   Imaging Personally Reviewed:  Electrocardiogram Personally Reviewed:    COORDINATION OF CARE:  Care Discussed with Consultants/Other Providers [Y/N]: jeanie Young dc plan to rehab, however, pt refusing and wants home with home PT  Prior or Outpatient Records Reviewed [Y/N]:  
Dr. Jaylene Lala  Pager [phone]    PROGRESS NOTE:     Patient is a 82y old  Female who presents with a chief complaint of Heart disease     (28 Jul 2022 13:30)      SUBJECTIVE / OVERNIGHT EVENTS: pt has some soreness at PPM site  ADDITIONAL REVIEW OF SYSTEMS: no bleeding from site, now she's agreeable for rehab after some persuasion from daughter    MEDICATIONS  (STANDING):  apixaban 5 milliGRAM(s) Oral every 12 hours  ascorbic acid 500 milliGRAM(s) Oral daily  aspirin enteric coated 81 milliGRAM(s) Oral daily  atorvastatin 40 milliGRAM(s) Oral at bedtime  calcium carbonate 1250 mG  + Vitamin D (OsCal 500 + D) 1 Tablet(s) Oral two times a day  losartan 25 milliGRAM(s) Oral daily  metoprolol succinate ER 25 milliGRAM(s) Oral daily  multivitamin 1 Tablet(s) Oral daily  nystatin Powder 1 Application(s) Topical every 12 hours  zinc sulfate 220 milliGRAM(s) Oral daily    MEDICATIONS  (PRN):  acetaminophen     Tablet .. 650 milliGRAM(s) Oral every 6 hours PRN Mild Pain (1 - 3), Moderate Pain (4 - 6)  traMADol 50 milliGRAM(s) Oral every 6 hours PRN Severe Pain (7 - 10)      CAPILLARY BLOOD GLUCOSE        I&O's Summary      PHYSICAL EXAM:  Vital Signs Last 24 Hrs  T(C): 36.9 (29 Jul 2022 08:20), Max: 36.9 (28 Jul 2022 18:57)  T(F): 98.4 (29 Jul 2022 08:20), Max: 98.4 (28 Jul 2022 18:57)  HR: 68 (29 Jul 2022 08:20) (62 - 71)  BP: 129/58 (29 Jul 2022 08:20) (116/50 - 129/58)  BP(mean): --  RR: 17 (29 Jul 2022 08:20) (17 - 19)  SpO2: 97% (29 Jul 2022 08:20) (95% - 100%)    Parameters below as of 29 Jul 2022 08:20  Patient On (Oxygen Delivery Method): room air      CONSTITUTIONAL: NAD, well-developed, well-groomed  EYES: PERRLA; conjunctiva and sclera clear  ENMT: Moist oral mucosa, no pharyngeal injection or exudates; normal dentition  NECK: Supple, no palpable masses; no thyromegaly  RESPIRATORY: Normal respiratory effort; lungs are clear to auscultation bilaterally  CARDIOVASCULAR: irregularly irregular, normal S1 and S2, no murmur/rub/gallop; No lower extremity edema; Peripheral pulses are 2+ bilaterally  ABDOMEN: Nontender to palpation, normoactive bowel sounds, no rebound/guarding; No hepatosplenomegaly  MUSCULOSKELETAL:  Normal gait; no clubbing or cyanosis of digits; no joint swelling or tenderness to palpation  PSYCH: A+O to person, place, and time; affect appropriate  NEUROLOGY: CN 2-12 are intact and symmetric; no gross sensory deficits   SKIN: facial periorbital ecchymosis     LABS:                        11.4   6.42  )-----------( 160      ( 29 Jul 2022 06:20 )             35.5     07-29    137  |  107  |  23  ----------------------------<  88  5.0   |  24  |  0.96    Ca    8.8      29 Jul 2022 06:20  Phos  3.3     07-29  Mg     1.60     07-29                  RADIOLOGY & ADDITIONAL TESTS:  Results Reviewed:   Imaging Personally Reviewed:  Electrocardiogram Personally Reviewed:    COORDINATION OF CARE:  Care Discussed with Consultants/Other Providers [Y/N]:  Prior or Outpatient Records Reviewed [Y/N]:

## 2022-07-29 NOTE — PROGRESS NOTE ADULT - PROVIDER SPECIALTY LIST ADULT
Electrophysiology
Cardiology
Electrophysiology
Electrophysiology
Cardiology
Hospitalist

## 2022-07-29 NOTE — PROGRESS NOTE ADULT - ASSESSMENT
81 y/o F with new onset afib, CAD, HTN, OA, spinal stenosis who presents to OSH for syncope transferred to [facility] for EP evaluation

## 2022-09-02 ENCOUNTER — APPOINTMENT (OUTPATIENT)
Dept: ELECTROPHYSIOLOGY | Facility: CLINIC | Age: 82
End: 2022-09-02

## 2023-01-17 NOTE — DISCHARGE NOTE PROVIDER - CARE PROVIDER_API CALL
English
Rigo Ray)  Cardiovascular Disease; Internal Medicine  [id number] [place] [address]  Phone: [phone]  Fax: [phone]  Follow Up Time:

## 2023-01-30 NOTE — DISCHARGE NOTE PROVIDER - NSDCQMPCI_CARD_ALL_CORE
POST-OP DIAGNOSIS:  Retained orthopedic hardware 30-Jan-2023 09:04:59 prominent right shoulder hardware Lester Hughes  
No

## 2024-02-20 NOTE — PATIENT PROFILE ADULT - FALL HARM RISK - CONCLUSION
Nutrition Update:    Day 10 of admit.  Lorraine Leung is a 79 y.o. female with admitting DX of Acute ischemic left MCA stroke (HCC) [I63.512].  Patient being followed to optimize nutrition.    Current Diet: regular w/ Ensure Compact TID    Recorded PO intake improved yesterday w/ intake of 25-50% at lunch and 50-75% at dinner. Pt w/ improved mentation yesterday. She is combative and agitated again today w/ intake of 0 at both breakfast and lunch. Per nurse, pt is not drinking her supplements. She is refusing all meds. Per nurse, pt has an infection w/ new order for abx.      Meds: Rocephin intramuscular injection started today.     Problem: Nutritional:  Goal: Achieve adequate nutritional intake  Description: Patient will consume >50% of meals and supplements  Outcome: not progressing    RD following.      Fall with Harm Risk